# Patient Record
Sex: FEMALE | Race: WHITE | URBAN - METROPOLITAN AREA
[De-identification: names, ages, dates, MRNs, and addresses within clinical notes are randomized per-mention and may not be internally consistent; named-entity substitution may affect disease eponyms.]

---

## 2017-04-08 ENCOUNTER — EMERGENCY (EMERGENCY)
Facility: HOSPITAL | Age: 72
LOS: 0 days | Discharge: HOME | End: 2017-04-08
Admitting: FAMILY MEDICINE
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
## 2017-06-27 DIAGNOSIS — I10 ESSENTIAL (PRIMARY) HYPERTENSION: ICD-10-CM
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
## 2017-06-27 DIAGNOSIS — E11.9 TYPE 2 DIABETES MELLITUS WITHOUT COMPLICATIONS: ICD-10-CM

## 2017-06-27 DIAGNOSIS — H57.8 OTHER SPECIFIED DISORDERS OF EYE AND ADNEXA: ICD-10-CM

## 2017-06-27 DIAGNOSIS — E78.00 PURE HYPERCHOLESTEROLEMIA, UNSPECIFIED: ICD-10-CM

## 2017-06-27 DIAGNOSIS — Z88.0 ALLERGY STATUS TO PENICILLIN: ICD-10-CM

## 2017-06-27 DIAGNOSIS — S05.02XA INJURY OF CONJUNCTIVA AND CORNEAL ABRASION WITHOUT FOREIGN BODY, LEFT EYE, INITIAL ENCOUNTER: ICD-10-CM
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
## 2017-07-17 ENCOUNTER — OUTPATIENT (OUTPATIENT)
Dept: OUTPATIENT SERVICES | Facility: HOSPITAL | Age: 72
LOS: 1 days | Discharge: HOME | End: 2017-07-17

## 2017-07-17 DIAGNOSIS — R42 DIZZINESS AND GIDDINESS: ICD-10-CM

## 2017-10-12 ENCOUNTER — OUTPATIENT (OUTPATIENT)
Dept: OUTPATIENT SERVICES | Facility: HOSPITAL | Age: 72
LOS: 1 days | Discharge: HOME | End: 2017-10-12

## 2017-10-12 DIAGNOSIS — Z12.31 ENCOUNTER FOR SCREENING MAMMOGRAM FOR MALIGNANT NEOPLASM OF BREAST: ICD-10-CM

## 2017-10-17 DIAGNOSIS — N95.9 UNSPECIFIED MENOPAUSAL AND PERIMENOPAUSAL DISORDER: ICD-10-CM

## 2017-10-17 DIAGNOSIS — Z13.820 ENCOUNTER FOR SCREENING FOR OSTEOPOROSIS: ICD-10-CM

## 2018-10-15 ENCOUNTER — OUTPATIENT (OUTPATIENT)
Dept: OUTPATIENT SERVICES | Facility: HOSPITAL | Age: 73
LOS: 1 days | Discharge: HOME | End: 2018-10-15

## 2018-10-15 DIAGNOSIS — Z12.31 ENCOUNTER FOR SCREENING MAMMOGRAM FOR MALIGNANT NEOPLASM OF BREAST: ICD-10-CM

## 2019-06-07 PROBLEM — Z00.00 ENCOUNTER FOR PREVENTIVE HEALTH EXAMINATION: Status: ACTIVE | Noted: 2019-06-07

## 2019-06-24 ENCOUNTER — RECORD ABSTRACTING (OUTPATIENT)
Age: 74
End: 2019-06-24

## 2019-06-24 DIAGNOSIS — Z86.69 PERSONAL HISTORY OF OTHER DISEASES OF THE NERVOUS SYSTEM AND SENSE ORGANS: ICD-10-CM

## 2019-06-24 DIAGNOSIS — Z87.898 PERSONAL HISTORY OF OTHER SPECIFIED CONDITIONS: ICD-10-CM

## 2019-06-24 DIAGNOSIS — Z87.891 PERSONAL HISTORY OF NICOTINE DEPENDENCE: ICD-10-CM

## 2019-06-24 DIAGNOSIS — R10.13 EPIGASTRIC PAIN: ICD-10-CM

## 2019-06-24 RX ORDER — METFORMIN ER 750 MG 750 MG/1
750 TABLET ORAL
Refills: 0 | Status: ACTIVE | COMMUNITY

## 2019-06-24 RX ORDER — LOSARTAN POTASSIUM 25 MG/1
25 TABLET, FILM COATED ORAL
Refills: 0 | Status: ACTIVE | COMMUNITY

## 2019-06-28 ENCOUNTER — RESULT REVIEW (OUTPATIENT)
Age: 74
End: 2019-06-28

## 2019-06-28 ENCOUNTER — OUTPATIENT (OUTPATIENT)
Dept: OUTPATIENT SERVICES | Facility: HOSPITAL | Age: 74
LOS: 1 days | Discharge: HOME | End: 2019-06-28
Payer: MEDICARE

## 2019-06-28 ENCOUNTER — TRANSCRIPTION ENCOUNTER (OUTPATIENT)
Age: 74
End: 2019-06-28

## 2019-06-28 VITALS — HEART RATE: 64 BPM | DIASTOLIC BLOOD PRESSURE: 67 MMHG | SYSTOLIC BLOOD PRESSURE: 112 MMHG | RESPIRATION RATE: 16 BRPM

## 2019-06-28 VITALS — WEIGHT: 164.91 LBS

## 2019-06-28 PROCEDURE — 88312 SPECIAL STAINS GROUP 1: CPT | Mod: 26

## 2019-06-28 PROCEDURE — 43239 EGD BIOPSY SINGLE/MULTIPLE: CPT

## 2019-06-28 PROCEDURE — 88305 TISSUE EXAM BY PATHOLOGIST: CPT | Mod: 26

## 2019-06-28 NOTE — ASU DISCHARGE PLAN (ADULT/PEDIATRIC) - CALL YOUR DOCTOR IF YOU HAVE ANY OF THE FOLLOWING:
Fever greater than (need to indicate Fahrenheit or Celsius)/Pain not relieved by Medications/Inability to tolerate liquids or foods/Nausea and vomiting that does not stop/Bleeding that does not stop

## 2019-06-28 NOTE — H&P PST ADULT - ASSESSMENT
73 yo F with PMH mentioned here for EGD for Dyspepsia   Risks and benefits discussed with the patient.   Will proceed with the scheduled case.

## 2019-07-01 LAB — SURGICAL PATHOLOGY STUDY: SIGNIFICANT CHANGE UP

## 2019-07-04 DIAGNOSIS — K44.9 DIAPHRAGMATIC HERNIA WITHOUT OBSTRUCTION OR GANGRENE: ICD-10-CM

## 2019-07-04 DIAGNOSIS — K31.7 POLYP OF STOMACH AND DUODENUM: ICD-10-CM

## 2019-07-04 DIAGNOSIS — K29.50 UNSPECIFIED CHRONIC GASTRITIS WITHOUT BLEEDING: ICD-10-CM

## 2019-07-04 DIAGNOSIS — R10.13 EPIGASTRIC PAIN: ICD-10-CM

## 2019-07-04 DIAGNOSIS — K20.8 OTHER ESOPHAGITIS: ICD-10-CM

## 2019-07-04 DIAGNOSIS — K59.8 OTHER SPECIFIED FUNCTIONAL INTESTINAL DISORDERS: ICD-10-CM

## 2019-07-17 ENCOUNTER — APPOINTMENT (OUTPATIENT)
Dept: GASTROENTEROLOGY | Facility: CLINIC | Age: 74
End: 2019-07-17
Payer: MEDICARE

## 2019-07-17 VITALS
SYSTOLIC BLOOD PRESSURE: 140 MMHG | BODY MASS INDEX: 32.44 KG/M2 | HEIGHT: 60 IN | WEIGHT: 165.25 LBS | HEART RATE: 81 BPM | DIASTOLIC BLOOD PRESSURE: 80 MMHG

## 2019-07-17 DIAGNOSIS — K31.A0 GASTRIC INTESTINAL METAPLASIA, UNSPECIFIED: ICD-10-CM

## 2019-07-17 DIAGNOSIS — K21.9 GASTRO-ESOPHAGEAL REFLUX DISEASE W/OUT ESOPHAGITIS: ICD-10-CM

## 2019-07-17 DIAGNOSIS — K29.70 GASTRITIS, UNSPECIFIED, W/OUT BLEEDING: ICD-10-CM

## 2019-07-17 DIAGNOSIS — K30 FUNCTIONAL DYSPEPSIA: ICD-10-CM

## 2019-07-17 DIAGNOSIS — K31.7 POLYP OF STOMACH AND DUODENUM: ICD-10-CM

## 2019-07-17 PROBLEM — I10 ESSENTIAL (PRIMARY) HYPERTENSION: Chronic | Status: ACTIVE | Noted: 2019-06-28

## 2019-07-17 PROBLEM — E11.9 TYPE 2 DIABETES MELLITUS WITHOUT COMPLICATIONS: Chronic | Status: ACTIVE | Noted: 2019-06-28

## 2019-07-17 PROCEDURE — 99214 OFFICE O/P EST MOD 30 MIN: CPT

## 2019-07-17 NOTE — HISTORY OF PRESENT ILLNESS
[Heartburn] : stable heartburn [Nausea] : denies nausea [Vomiting] : denies vomiting [Diarrhea] : denies diarrhea [Constipation] : denies constipation [Yellow Skin Or Eyes (Jaundice)] : denies jaundice [Abdominal Pain] : denies abdominal pain [Abdominal Swelling] : denies abdominal swelling [Rectal Pain] : denies rectal pain [de-identified] : Patient is a 72 female with past medical history of hypertension, diabetes, and history of GERD that  presents for followup on of GERD. Patient was prior using a lot of cinamon  which gave her a lot of her dyspepsia. Patient had an EGD done on June 28, 2019 which was notable for esophageal hiatal hernia, esophagitis, erythema in the stomach compatible with a nonerosive gastritis, as well as 3 gastric polyps which were on appearance of a likely inflammatory. Polyps were removed from the stomach noted for intestinal metaplasia and with foveloar hyperplasia without dysplasia. Patient also found at the GE junction to have basal cell hyperplasia. Patient currently feels well and notes dyspepsia is improved.

## 2019-07-17 NOTE — ASSESSMENT
[FreeTextEntry1] : Patient is a 72 female with past medical history of hypertension, diabetes, and history of GERD that  presents for followup on of GERD. Patient was prior using a lot of cinamon  which gave her a lot of her dyspepsia. Patient had an EGD done on June 28, 2019 which was notable for esophageal hiatal hernia, esophagitis, erythema in the stomach compatible with a nonerosive gastritis, as well as 3 gastric polyps which were on appearance of a likely inflammatory. Polyps were removed from the stomach noted for intestinal metaplasia and with foveloar hyperplasia without dysplasia. Patient also found at the GE junction to have basal cell hyperplasia. Patient currently feels well and notes dyspepsia is improved.\par \par GERD/ Foveloar Hyperplasia/ Intestinal Metaplasia - no dysplasia\par - Dietary modification , low caffeine, low acidic foods, avoid cinnamon in excess\par - Avoid Meals 3-4 hours before bed\par - Head of Bed over 30 degrees\par - Pantoprazole 40mg daily\par - Would repeat EGD in one year\par - Follow up in 10 months \par

## 2019-07-17 NOTE — PHYSICAL EXAM
[General Appearance - Alert] : alert [General Appearance - In No Acute Distress] : in no acute distress [Sclera] : the sclera and conjunctiva were normal [PERRL With Normal Accommodation] : pupils were equal in size, round, and reactive to light [Extraocular Movements] : extraocular movements were intact [Outer Ear] : the ears and nose were normal in appearance [Oropharynx] : the oropharynx was normal [Neck Appearance] : the appearance of the neck was normal [Neck Cervical Mass (___cm)] : no neck mass was observed [Jugular Venous Distention Increased] : there was no jugular-venous distention [Thyroid Diffuse Enlargement] : the thyroid was not enlarged [Thyroid Nodule] : there were no palpable thyroid nodules [Auscultation Breath Sounds / Voice Sounds] : lungs were clear to auscultation bilaterally [Heart Rate And Rhythm] : heart rate was normal and rhythm regular [Heart Sounds] : normal S1 and S2 [Heart Sounds Gallop] : no gallops [Murmurs] : no murmurs [Heart Sounds Pericardial Friction Rub] : no pericardial rub [Bowel Sounds] : normal bowel sounds [Abdomen Soft] : soft [Abdomen Tenderness] : non-tender [] : no hepato-splenomegaly [Abdomen Mass (___ Cm)] : no abdominal mass palpated [Abnormal Walk] : normal gait [Skin Color & Pigmentation] : normal skin color and pigmentation [Sensation] : the sensory exam was normal to light touch and pinprick [Oriented To Time, Place, And Person] : oriented to person, place, and time

## 2019-10-07 ENCOUNTER — OUTPATIENT (OUTPATIENT)
Dept: OUTPATIENT SERVICES | Facility: HOSPITAL | Age: 74
LOS: 1 days | Discharge: HOME | End: 2019-10-07
Payer: MEDICARE

## 2019-10-07 DIAGNOSIS — Z12.31 ENCOUNTER FOR SCREENING MAMMOGRAM FOR MALIGNANT NEOPLASM OF BREAST: ICD-10-CM

## 2019-10-07 PROCEDURE — 77067 SCR MAMMO BI INCL CAD: CPT | Mod: 26

## 2019-10-07 PROCEDURE — 77063 BREAST TOMOSYNTHESIS BI: CPT | Mod: 26

## 2019-10-08 DIAGNOSIS — N95.9 UNSPECIFIED MENOPAUSAL AND PERIMENOPAUSAL DISORDER: ICD-10-CM

## 2019-10-08 DIAGNOSIS — Z13.820 ENCOUNTER FOR SCREENING FOR OSTEOPOROSIS: ICD-10-CM

## 2020-01-15 ENCOUNTER — APPOINTMENT (OUTPATIENT)
Dept: GASTROENTEROLOGY | Facility: CLINIC | Age: 75
End: 2020-01-15

## 2020-04-29 ENCOUNTER — OUTPATIENT (OUTPATIENT)
Dept: OUTPATIENT SERVICES | Facility: HOSPITAL | Age: 75
LOS: 1 days | Discharge: HOME | End: 2020-04-29
Payer: MEDICARE

## 2020-04-29 DIAGNOSIS — R22.1 LOCALIZED SWELLING, MASS AND LUMP, NECK: ICD-10-CM

## 2020-04-29 PROCEDURE — 76536 US EXAM OF HEAD AND NECK: CPT | Mod: 26

## 2020-07-12 ENCOUNTER — OUTPATIENT (OUTPATIENT)
Dept: OUTPATIENT SERVICES | Facility: HOSPITAL | Age: 75
LOS: 1 days | Discharge: HOME | End: 2020-07-12
Payer: MEDICARE

## 2020-07-12 DIAGNOSIS — R10.9 UNSPECIFIED ABDOMINAL PAIN: ICD-10-CM

## 2020-07-12 PROCEDURE — 76705 ECHO EXAM OF ABDOMEN: CPT | Mod: 26

## 2020-10-09 ENCOUNTER — OUTPATIENT (OUTPATIENT)
Dept: OUTPATIENT SERVICES | Facility: HOSPITAL | Age: 75
LOS: 1 days | Discharge: HOME | End: 2020-10-09
Payer: MEDICARE

## 2020-10-09 DIAGNOSIS — Z12.31 ENCOUNTER FOR SCREENING MAMMOGRAM FOR MALIGNANT NEOPLASM OF BREAST: ICD-10-CM

## 2020-10-09 PROCEDURE — 77063 BREAST TOMOSYNTHESIS BI: CPT | Mod: 26

## 2020-10-09 PROCEDURE — 77067 SCR MAMMO BI INCL CAD: CPT | Mod: 26

## 2021-08-11 ENCOUNTER — APPOINTMENT (OUTPATIENT)
Dept: ORTHOPEDIC SURGERY | Facility: CLINIC | Age: 76
End: 2021-08-11
Payer: MEDICARE

## 2021-08-11 ENCOUNTER — NON-APPOINTMENT (OUTPATIENT)
Age: 76
End: 2021-08-11

## 2021-08-11 VITALS — TEMPERATURE: 98 F

## 2021-08-11 DIAGNOSIS — M25.562 PAIN IN LEFT KNEE: ICD-10-CM

## 2021-08-11 DIAGNOSIS — E11.9 TYPE 2 DIABETES MELLITUS W/OUT COMPLICATIONS: ICD-10-CM

## 2021-08-11 DIAGNOSIS — K21.9 GASTRO-ESOPHAGEAL REFLUX DISEASE W/OUT ESOPHAGITIS: ICD-10-CM

## 2021-08-11 DIAGNOSIS — M25.561 PAIN IN RIGHT KNEE: ICD-10-CM

## 2021-08-11 DIAGNOSIS — I10 ESSENTIAL (PRIMARY) HYPERTENSION: ICD-10-CM

## 2021-08-11 PROCEDURE — 99204 OFFICE O/P NEW MOD 45 MIN: CPT

## 2021-08-11 PROCEDURE — 73562 X-RAY EXAM OF KNEE 3: CPT | Mod: RT

## 2021-08-13 NOTE — HISTORY OF PRESENT ILLNESS
[de-identified] : 76 year old female presents to the office today complaining of bilateral knee pain, left knee pain worse than right x several years. Patient reports pain that is achy in nature. There is swelling, but patient denies any buckling, locking, or loss of motion. Patient reports only being able to ambulate about ½ block before pain stops her. There is pain and difficulty with negotiating stairs, worse with ascending. Patient is taking Tylenol and Motrin with good relief. Patient is here today to discuss her next options for pain relief.

## 2021-08-13 NOTE — PHYSICAL EXAM
[de-identified] : General appearance: well nourished and hydrated, pleasant, alert and oriented x 3, cooperative.\par Cardiovascular: no apparent abnormalities, no lower leg edema, no varicosities, pedal pulses are palpable.\par Neurologic: sensation is normal, no muscle weakness in upper or lower extremities\par Dermatologic no apparent skin lesions, moist, warm, no rash.\par Gait: nonantalgic.\par \par Left knee\par Inspection: no effusion or erythema.\par Wounds: none.\par Alignment: normal.\par Palpation: none \par ROM: 0-115 degrees, mild PF crepitus \par Ligamentous laxity: all ligaments appear stable\par Muscle Test: good quad strength.\par \par Right knee\par Inspection: no effusion or erythema.\par Wounds: none.\par Alignment: normal.\par Palpation: none\par ROM: 0-115 degrees, mild PF crepitus \par Ligamentous laxity: all ligaments appear stable\par Muscle Test: good quad strength.\par \par Left hip\par Inspection: No swelling or ecchymosis.\par Wounds: none.\par Palpation: non-tender.\par Stability: no instability.\par Strength: 5/5 all motor groups.\par ROM: Flexion to 70, ER 30, ABD 50 \par Leg length: equal.\par \par Right hip\par Inspection: No swelling or ecchymosis.\par Wounds: none.\par Palpation: non-tender.\par Stability: no instability.\par Strength: 5/5 all motor groups.\par ROM: Flexion to 70, ER 30, ABD 50 \par Leg length: equal.\par \par \par \par  [de-identified] : Radiographs done today AP lateral and skyline of both knees shows moderate medial joint space narrowing of both knees with significant join space narrowing of both PF joints on skyline views.

## 2021-08-13 NOTE — ASSESSMENT
[FreeTextEntry1] : 76 year old female presents to the office for bilateral knee pain. She is very active, she does a lot of work in her yard and in the house. Her baseline pain is 1/10 in the right and 4/10 in the left. Although after an active day the pain is worse. Her pain is primarily related to stair climbing. We discussed conservative Tx options. She’s taking OTC meds but is looking for something more. We decided upon gel injections.\par \par \par *ORDER GEL INJECTIONS*\par \par \par

## 2021-09-24 ENCOUNTER — MED ADMIN CHARGE (OUTPATIENT)
Age: 76
End: 2021-09-24

## 2021-09-24 ENCOUNTER — APPOINTMENT (OUTPATIENT)
Dept: ORTHOPEDIC SURGERY | Facility: CLINIC | Age: 76
End: 2021-09-24
Payer: MEDICARE

## 2021-09-24 PROCEDURE — 20610 DRAIN/INJ JOINT/BURSA W/O US: CPT | Mod: LT

## 2021-09-24 RX ORDER — HYALURONATE SODIUM, STABILIZED 88 MG/4 ML
88 SYRINGE (ML) INTRAARTICULAR
Qty: 1 | Refills: 0 | Status: COMPLETED | OUTPATIENT
Start: 2021-09-24

## 2021-09-24 RX ADMIN — Medication 0 MG/4ML: at 00:00

## 2021-10-06 PROBLEM — K31.A0 INTESTINAL METAPLASIA OF GASTRIC MUCOSA: Status: ACTIVE | Noted: 2019-07-17

## 2021-10-21 ENCOUNTER — OUTPATIENT (OUTPATIENT)
Dept: OUTPATIENT SERVICES | Facility: HOSPITAL | Age: 76
LOS: 1 days | Discharge: HOME | End: 2021-10-21
Payer: MEDICARE

## 2021-10-21 DIAGNOSIS — Z12.31 ENCOUNTER FOR SCREENING MAMMOGRAM FOR MALIGNANT NEOPLASM OF BREAST: ICD-10-CM

## 2021-10-21 PROCEDURE — 77067 SCR MAMMO BI INCL CAD: CPT | Mod: 26

## 2021-10-21 PROCEDURE — 77063 BREAST TOMOSYNTHESIS BI: CPT | Mod: 26

## 2021-11-29 ENCOUNTER — OUTPATIENT (OUTPATIENT)
Dept: OUTPATIENT SERVICES | Facility: HOSPITAL | Age: 76
LOS: 1 days | Discharge: HOME | End: 2021-11-29
Payer: MEDICARE

## 2021-11-29 DIAGNOSIS — M25.519 PAIN IN UNSPECIFIED SHOULDER: ICD-10-CM

## 2021-11-29 PROCEDURE — 73030 X-RAY EXAM OF SHOULDER: CPT | Mod: 26,RT

## 2022-03-18 ENCOUNTER — APPOINTMENT (OUTPATIENT)
Dept: ORTHOPEDIC SURGERY | Facility: CLINIC | Age: 77
End: 2022-03-18
Payer: MEDICARE

## 2022-03-18 PROCEDURE — 99214 OFFICE O/P EST MOD 30 MIN: CPT

## 2022-03-18 RX ORDER — MELOXICAM 15 MG/1
15 TABLET ORAL
Qty: 30 | Refills: 0 | Status: ACTIVE | COMMUNITY
Start: 2022-03-18 | End: 1900-01-01

## 2022-03-18 RX ORDER — PANTOPRAZOLE 40 MG/1
40 TABLET, DELAYED RELEASE ORAL DAILY
Qty: 30 | Refills: 0 | Status: ACTIVE | COMMUNITY
Start: 2019-07-17 | End: 1900-01-01

## 2022-03-18 RX ORDER — MELOXICAM 15 MG/1
15 TABLET ORAL DAILY
Qty: 30 | Refills: 0 | Status: DISCONTINUED | COMMUNITY
Start: 2022-03-18 | End: 2022-03-18

## 2022-03-18 NOTE — HISTORY OF PRESENT ILLNESS
[de-identified] : 76 year old female presents to the office today for Monovisc injections into her left arthritic knee.

## 2022-03-21 NOTE — HISTORY OF PRESENT ILLNESS
[de-identified] : 76 year old female presents to the office today for a follow up after having received a Euflexxa injection into her arthritic left knee. Patient denies any pain relief from the injection. She states there was bruising and erythema after but then it improved. Patient is complaining of pinching pain that is sharp in nature, mostly medial. this pain was here prior to her Monovisc injection and has not worsened or improved. She reports times of increased swelling in the left knee. She was seen at another orthopedists office who ordered an MRI, but she denies ever getting a report or results from this. Patient is here today to discuss her next options for pain relief.

## 2022-03-21 NOTE — ASSESSMENT
[FreeTextEntry1] : Pt presents for a follow up of her left arthritic knee. She had gel injections in the past which she claims made her feel worse. She apparently had a MRI at [facility] in 2017. Pt does not know the results. The daughter zina try to obtain it for our review. In the meantime we will start her on Meloxicam with Pantoprazole. The pt and family requested an MRI to look for a meniscal tear.

## 2022-03-21 NOTE — PHYSICAL EXAM
[de-identified] : Left Knee\par Inspection: no effusion or erythema.\par Wounds: none.\par Alignment: normal.\par Palpation: medial joint line tenderness \par ROM: PF crepitus , 0-120\par Ligamentous laxity: all ligaments appear stable\par Meniscal Test: negative McMurrays.\par Muscle Test: good quad strength.\par Leg examination: calf is soft and non-tender.\par \par Right knee\par Inspection: no effusion or erythema.\par Wounds: none.\par Alignment: normal.\par Palpation: no specific tenderness on palpation.\par ROM: 0-120 degrees \par Ligamentous laxity: all ligaments appear \par Meniscal Test: negative McMurrays\par Muscle Test: good quad strength.\par Leg examination: calf is soft and non-tender.\par   [de-identified] : Radiographs done today AP lateral and skyline of both knees shows significant narrowing of medial joint space with almost complete loss of PF joint space on the left. The right knee shows minimal medial joint space narrowing.

## 2022-04-11 ENCOUNTER — RESULT REVIEW (OUTPATIENT)
Age: 77
End: 2022-04-11

## 2022-04-11 ENCOUNTER — OUTPATIENT (OUTPATIENT)
Dept: OUTPATIENT SERVICES | Facility: HOSPITAL | Age: 77
LOS: 1 days | Discharge: HOME | End: 2022-04-11
Payer: MEDICARE

## 2022-04-11 DIAGNOSIS — M25.569 PAIN IN UNSPECIFIED KNEE: ICD-10-CM

## 2022-04-11 PROCEDURE — 73721 MRI JNT OF LWR EXTRE W/O DYE: CPT | Mod: 26,LT

## 2022-06-07 PROBLEM — Z78.9 NO PERTINENT FAMILY HISTORY: Status: ACTIVE | Noted: 2022-06-07

## 2022-06-08 ENCOUNTER — APPOINTMENT (OUTPATIENT)
Dept: ORTHOPEDIC SURGERY | Facility: CLINIC | Age: 77
End: 2022-06-08
Payer: MEDICARE

## 2022-06-08 DIAGNOSIS — Z78.9 OTHER SPECIFIED HEALTH STATUS: ICD-10-CM

## 2022-06-08 DIAGNOSIS — M17.12 UNILATERAL PRIMARY OSTEOARTHRITIS, LEFT KNEE: ICD-10-CM

## 2022-06-08 DIAGNOSIS — M17.11 UNILATERAL PRIMARY OSTEOARTHRITIS, RIGHT KNEE: ICD-10-CM

## 2022-06-08 PROCEDURE — 99214 OFFICE O/P EST MOD 30 MIN: CPT

## 2022-06-08 RX ORDER — MELOXICAM 15 MG/1
15 TABLET ORAL
Qty: 30 | Refills: 0 | Status: ACTIVE | COMMUNITY
Start: 2022-06-08 | End: 1900-01-01

## 2022-06-08 NOTE — HISTORY OF PRESENT ILLNESS
[de-identified] : 3/18/2022- 76 year old female presents to the office today for a follow up after having received a Euflexxa injection into her arthritic left knee. Patient denies any pain relief from the injection. She states there was bruising and erythema after but then it improved. Patient is complaining of pinching pain that is sharp in nature, mostly medial. this pain was here prior to her Monovisc injection and has not worsened or improved. She reports times of increased swelling in the left knee. She was seen at another orthopedists office who ordered an MRI, but she denies ever getting a report or results from this. Patient is here today to discuss her next options for pain relief.\par \par 6/8/2022 - 77 year old female presents to the office today for a pre op discussion. After last seeing us, an MRI was obtained which showed advanced osteoarthritis of her left knee. Patient decided to move forward with a left total knee replacement and is scheduled for October 2022. Patient denies any change in her medical history. She is taking Motrin for pain daily with only minimal relief. Patient states she was taking Meloxicam in the past with some relief, but has run out of this and would like a refill to help control her pain while she waits for her surgical date.

## 2022-06-08 NOTE — ASSESSMENT
[FreeTextEntry1] : 3/18/2022- Pt presents for a follow up of her left arthritic knee. She had gel injections in the past which she claims made her feel worse. She apparently had a MRI at [facility] in 2017. Pt does not know the results. The daughter will try to obtain it for our review. In the meantime we will start her on Meloxicam with Pantoprazole. The pt and family requested an MRI to look for a meniscal tear. \par \par 6/8/2022- Pt presents for a pre op discussion. She is scheduled for a LTK in October. She has had no interval change in her medical status or medications. We discussed all the risks and benefits of Sx. I answered all her questions. We will proceed as as planned. \par \par The risks, benefits, alternatives of treatment, and aftercare precautions following joint replacement surgery were reviewed with the patient and all questions were answered. Models were used, radiographs reviewed and a brochure was given to the patient. The implant type utilized, including bearing surfaces fixation techniques were reviewed in detail, and the patient chose to proceed with elective joint replacement surgery. The patient's body mass index was recorded in my office notes, and for those patients whose  body mass index of greater than 30, I recommended that they review a weight reduction program with their internist. The importance of smoking cessation was reviewed with all patient's, and for those patients who still smoke, I recommended that they review a smoking cessation program with their internist.

## 2022-06-08 NOTE — PHYSICAL EXAM
[de-identified] : Left Knee\par Inspection: no effusion or erythema.\par Wounds: none.\par Alignment: normal.\par Palpation: medial joint line tenderness \par ROM: PF crepitus , 0-120\par Ligamentous laxity: all ligaments appear stable\par Meniscal Test: negative McMurrays.\par Muscle Test: good quad strength.\par Leg examination: calf is soft and non-tender.\par \par Right knee\par Inspection: no effusion or erythema.\par Wounds: none.\par Alignment: normal.\par Palpation: no specific tenderness on palpation.\par ROM: 0-120 degrees \par Ligamentous laxity: all ligaments appear \par Meniscal Test: negative McMurrays\par Muscle Test: good quad strength.\par Leg examination: calf is soft and non-tender.\par   [de-identified] : 3/18/2022- Radiographs done today AP lateral and skyline of both knees shows significant narrowing of medial joint space with almost complete loss of PF joint space on the left. The right knee shows minimal medial joint space narrowing.

## 2022-07-05 ENCOUNTER — RX RENEWAL (OUTPATIENT)
Age: 77
End: 2022-07-05

## 2022-07-19 ENCOUNTER — RESULT REVIEW (OUTPATIENT)
Age: 77
End: 2022-07-19

## 2022-07-19 ENCOUNTER — OUTPATIENT (OUTPATIENT)
Dept: OUTPATIENT SERVICES | Facility: HOSPITAL | Age: 77
LOS: 1 days | Discharge: HOME | End: 2022-07-19

## 2022-07-19 VITALS
HEIGHT: 60 IN | HEART RATE: 68 BPM | TEMPERATURE: 96 F | RESPIRATION RATE: 18 BRPM | WEIGHT: 154.1 LBS | OXYGEN SATURATION: 98 % | SYSTOLIC BLOOD PRESSURE: 157 MMHG | DIASTOLIC BLOOD PRESSURE: 71 MMHG

## 2022-07-19 DIAGNOSIS — Z01.818 ENCOUNTER FOR OTHER PREPROCEDURAL EXAMINATION: ICD-10-CM

## 2022-07-19 DIAGNOSIS — M17.12 UNILATERAL PRIMARY OSTEOARTHRITIS, LEFT KNEE: ICD-10-CM

## 2022-07-19 DIAGNOSIS — Z98.890 OTHER SPECIFIED POSTPROCEDURAL STATES: Chronic | ICD-10-CM

## 2022-07-19 LAB
A1C WITH ESTIMATED AVERAGE GLUCOSE RESULT: 7 % — HIGH (ref 4–5.6)
ALBUMIN SERPL ELPH-MCNC: 4.1 G/DL — SIGNIFICANT CHANGE UP (ref 3.5–5.2)
ALP SERPL-CCNC: 108 U/L — SIGNIFICANT CHANGE UP (ref 30–115)
ALT FLD-CCNC: 11 U/L — SIGNIFICANT CHANGE UP (ref 0–41)
ANION GAP SERPL CALC-SCNC: 11 MMOL/L — SIGNIFICANT CHANGE UP (ref 7–14)
APTT BLD: 28.1 SEC — SIGNIFICANT CHANGE UP (ref 27–39.2)
AST SERPL-CCNC: 12 U/L — SIGNIFICANT CHANGE UP (ref 0–41)
BASOPHILS # BLD AUTO: 0.02 K/UL — SIGNIFICANT CHANGE UP (ref 0–0.2)
BASOPHILS NFR BLD AUTO: 0.3 % — SIGNIFICANT CHANGE UP (ref 0–1)
BILIRUB SERPL-MCNC: 0.5 MG/DL — SIGNIFICANT CHANGE UP (ref 0.2–1.2)
BLD GP AB SCN SERPL QL: SIGNIFICANT CHANGE UP
BUN SERPL-MCNC: 18 MG/DL — SIGNIFICANT CHANGE UP (ref 10–20)
CALCIUM SERPL-MCNC: 9.4 MG/DL — SIGNIFICANT CHANGE UP (ref 8.5–10.1)
CHLORIDE SERPL-SCNC: 103 MMOL/L — SIGNIFICANT CHANGE UP (ref 98–110)
CO2 SERPL-SCNC: 27 MMOL/L — SIGNIFICANT CHANGE UP (ref 17–32)
CREAT SERPL-MCNC: 0.7 MG/DL — SIGNIFICANT CHANGE UP (ref 0.7–1.5)
EGFR: 90 ML/MIN/1.73M2 — SIGNIFICANT CHANGE UP
EOSINOPHIL # BLD AUTO: 0.14 K/UL — SIGNIFICANT CHANGE UP (ref 0–0.7)
EOSINOPHIL NFR BLD AUTO: 2.3 % — SIGNIFICANT CHANGE UP (ref 0–8)
ESTIMATED AVERAGE GLUCOSE: 154 MG/DL — HIGH (ref 68–114)
GLUCOSE SERPL-MCNC: 177 MG/DL — HIGH (ref 70–99)
HCT VFR BLD CALC: 40.7 % — SIGNIFICANT CHANGE UP (ref 37–47)
HGB BLD-MCNC: 14 G/DL — SIGNIFICANT CHANGE UP (ref 12–16)
IMM GRANULOCYTES NFR BLD AUTO: 0.5 % — HIGH (ref 0.1–0.3)
INR BLD: 0.98 RATIO — SIGNIFICANT CHANGE UP (ref 0.65–1.3)
LYMPHOCYTES # BLD AUTO: 2.43 K/UL — SIGNIFICANT CHANGE UP (ref 1.2–3.4)
LYMPHOCYTES # BLD AUTO: 39.3 % — SIGNIFICANT CHANGE UP (ref 20.5–51.1)
MCHC RBC-ENTMCNC: 30.1 PG — SIGNIFICANT CHANGE UP (ref 27–31)
MCHC RBC-ENTMCNC: 34.4 G/DL — SIGNIFICANT CHANGE UP (ref 32–37)
MCV RBC AUTO: 87.5 FL — SIGNIFICANT CHANGE UP (ref 81–99)
MONOCYTES # BLD AUTO: 0.55 K/UL — SIGNIFICANT CHANGE UP (ref 0.1–0.6)
MONOCYTES NFR BLD AUTO: 8.9 % — SIGNIFICANT CHANGE UP (ref 1.7–9.3)
MRSA PCR RESULT.: NEGATIVE — SIGNIFICANT CHANGE UP
NEUTROPHILS # BLD AUTO: 3.02 K/UL — SIGNIFICANT CHANGE UP (ref 1.4–6.5)
NEUTROPHILS NFR BLD AUTO: 48.7 % — SIGNIFICANT CHANGE UP (ref 42.2–75.2)
NRBC # BLD: 0 /100 WBCS — SIGNIFICANT CHANGE UP (ref 0–0)
PLATELET # BLD AUTO: 188 K/UL — SIGNIFICANT CHANGE UP (ref 130–400)
POTASSIUM SERPL-MCNC: 3.9 MMOL/L — SIGNIFICANT CHANGE UP (ref 3.5–5)
POTASSIUM SERPL-SCNC: 3.9 MMOL/L — SIGNIFICANT CHANGE UP (ref 3.5–5)
PROT SERPL-MCNC: 6.6 G/DL — SIGNIFICANT CHANGE UP (ref 6–8)
PROTHROM AB SERPL-ACNC: 11.3 SEC — SIGNIFICANT CHANGE UP (ref 9.95–12.87)
RBC # BLD: 4.65 M/UL — SIGNIFICANT CHANGE UP (ref 4.2–5.4)
RBC # FLD: 12.7 % — SIGNIFICANT CHANGE UP (ref 11.5–14.5)
SODIUM SERPL-SCNC: 141 MMOL/L — SIGNIFICANT CHANGE UP (ref 135–146)
WBC # BLD: 6.19 K/UL — SIGNIFICANT CHANGE UP (ref 4.8–10.8)
WBC # FLD AUTO: 6.19 K/UL — SIGNIFICANT CHANGE UP (ref 4.8–10.8)

## 2022-07-19 PROCEDURE — 93010 ELECTROCARDIOGRAM REPORT: CPT

## 2022-07-19 PROCEDURE — 72170 X-RAY EXAM OF PELVIS: CPT | Mod: 26

## 2022-07-19 PROCEDURE — 71046 X-RAY EXAM CHEST 2 VIEWS: CPT | Mod: 26

## 2022-07-19 PROCEDURE — 73562 X-RAY EXAM OF KNEE 3: CPT | Mod: 26,LT

## 2022-07-19 RX ORDER — LOSARTAN POTASSIUM 100 MG/1
1 TABLET, FILM COATED ORAL
Qty: 0 | Refills: 0 | DISCHARGE

## 2022-07-19 NOTE — H&P PST ADULT - NSICDXPASTSURGICALHX_GEN_ALL_CORE_FT
PAST SURGICAL HISTORY:  H/O shoulder surgery right    Previous back surgery     S/P surgical removal of pilonidal cyst

## 2022-07-19 NOTE — H&P PST ADULT - NSICDXPASTMEDICALHX_GEN_ALL_CORE_FT
PAST MEDICAL HISTORY:  Diabetes niddm    HTN (hypertension)     OA (osteoarthritis)      PAST MEDICAL HISTORY:  Diabetes niddm    HTN (hypertension)     OA (osteoarthritis)     Obesity bmi 30.1

## 2022-07-19 NOTE — H&P PST ADULT - SOURCE OF INFORMATION, PROFILE
Notification of Discharge   This is a Notification of Discharge from our facility [address]  Please be advised that this patient has been discharge from our facility  Below you will find the admission and discharge date and time including the patients disposition  UTILIZATION REVIEW CONTACT:  Ethel Baker  Utilization   Network Utilization Review Department  Phone: [phone] x carefully listen to the prompts  All voicemails are confidential   Email: [email or web address]     PHYSICIAN ADVISORY SERVICES:  FOR [id number] REVIEW - MEDICAL NECESSITY DENIAL  Phone: [phone]  Fax: [phone]  Email: [email or web address]     PRESENTATION DATE: 5/3/2021  6:51 PM  OBERVATION ADMISSION DATE:  INPATIENT ADMISSION DATE: 5/3/21 2128   DISCHARGE DATE: 5/7/2021  5:21 PM  DISPOSITION: Home with [place] with [address] INFORMATION:  Send all requests for admission clinical reviews, approved or denied determinations and any other requests to dedicated fax number below belonging to the campus where the patient is receiving treatment   List of dedicated fax numbers:  [address] DENIALS (Administrative/Medical Necessity) [phone]   [address] (Maternity/NICU/Pediatrics) [phone]   Sharifa Fines [phone]   [facility] [phone]   [facility] [address] [facility] [phone]   Senait Estrada [address] [facility] [phone]   [facility]  [phone]   [address] [facility]  [address] [facility] [address] [facility] [phone] patient

## 2022-07-19 NOTE — H&P PST ADULT - HISTORY OF PRESENT ILLNESS
fell 2017 and since then left knee an issue  worsen over the past 2 yrs  therapies not working   now for planed procedure       PATIENT CURRENTLY DENIES CHEST PAIN  SHORTNESS OF BREATH  PALPITATIONS,  DYSURIA, OR UPPER RESPIRATORY INFECTION IN PAST 2 WEEKS  EXERCISE  TOLERANCE  1-2 FLIGHT OF STAIRS  WITHOUT SHORTNESS OF BREATH  denies travel outside the [place] in the past 30 days  Patient denies any signs or symptoms of COVID 19 and denies contact with known positive individuals.  They have an appointment for repeat COVID testing pre-procedure and acknowledge its time and place.  They were instructed to quarantine pre-procedure, practice exposure control measures, continue to self-monitor and report any concerns to their proceduralist.  pt advised self quarantine till day of procedure  Anesthesia Alert  NO--Difficult Airway  NO--History of neck surgery or radiation  NO--Limited ROM of neck  NO--History of Malignant hyperthermia  NO--No personal or family history of Pseudocholinesterase deficiency.  NO--Prior Anesthesia Complication  NO--Latex Allergy  NO--Loose teeth  NO--History of Rheumatoid Arthritis  NO--Bleeding risk  NO--TRU  NO--Other_____    PT DENIES ANY RASHES, ABRASION, OR OPEN WOUNDS OR CUTS    AS PER THE PT, THIS IS HIS/HER COMPLETE MEDICAL AND SURGICAL HX, INCLUDING MEDICATIONS PRESCRIBED AND OVER THE COUNTER    Patient verbalized understanding of instructions and was given the opportunity to ask questions and have them answered.    pt denies any suicidal ideation or thoughts, pt states not a threat to self or others    M17.12/M17.12    Primary osteoarthritis of left knee    Encounter for other preprocedural examination    ^M17.12/M17.12    Primary osteoarthritis of left knee    Encounter for other preprocedural examination    HTN (hypertension)    Diabetes

## 2022-07-30 ENCOUNTER — LABORATORY RESULT (OUTPATIENT)
Age: 77
End: 2022-07-30

## 2022-08-02 ENCOUNTER — APPOINTMENT (OUTPATIENT)
Dept: ORTHOPEDIC SURGERY | Facility: HOSPITAL | Age: 77
End: 2022-08-02

## 2022-08-02 ENCOUNTER — RESULT REVIEW (OUTPATIENT)
Age: 77
End: 2022-08-02

## 2022-08-02 ENCOUNTER — TRANSCRIPTION ENCOUNTER (OUTPATIENT)
Age: 77
End: 2022-08-02

## 2022-08-02 ENCOUNTER — INPATIENT (INPATIENT)
Facility: HOSPITAL | Age: 77
LOS: 0 days | Discharge: HOME | End: 2022-08-03
Attending: ORTHOPAEDIC SURGERY | Admitting: ORTHOPAEDIC SURGERY

## 2022-08-02 VITALS
DIASTOLIC BLOOD PRESSURE: 65 MMHG | SYSTOLIC BLOOD PRESSURE: 144 MMHG | HEART RATE: 65 BPM | RESPIRATION RATE: 17 BRPM | TEMPERATURE: 97 F | WEIGHT: 154.1 LBS | HEIGHT: 59 IN | OXYGEN SATURATION: 100 %

## 2022-08-02 DIAGNOSIS — Z98.890 OTHER SPECIFIED POSTPROCEDURAL STATES: Chronic | ICD-10-CM

## 2022-08-02 LAB
GLUCOSE BLDC GLUCOMTR-MCNC: 151 MG/DL — HIGH (ref 70–99)
GLUCOSE BLDC GLUCOMTR-MCNC: 188 MG/DL — HIGH (ref 70–99)
GLUCOSE BLDC GLUCOMTR-MCNC: 191 MG/DL — HIGH (ref 70–99)
GLUCOSE BLDC GLUCOMTR-MCNC: 201 MG/DL — HIGH (ref 70–99)

## 2022-08-02 PROCEDURE — 88305 TISSUE EXAM BY PATHOLOGIST: CPT | Mod: 26

## 2022-08-02 PROCEDURE — 27447 TOTAL KNEE ARTHROPLASTY: CPT | Mod: LT

## 2022-08-02 PROCEDURE — 88311 DECALCIFY TISSUE: CPT | Mod: 26

## 2022-08-02 PROCEDURE — 73560 X-RAY EXAM OF KNEE 1 OR 2: CPT | Mod: 26,LT

## 2022-08-02 RX ORDER — DEXTROSE 50 % IN WATER 50 %
25 SYRINGE (ML) INTRAVENOUS ONCE
Refills: 0 | Status: DISCONTINUED | OUTPATIENT
Start: 2022-08-02 | End: 2022-08-03

## 2022-08-02 RX ORDER — CELECOXIB 200 MG/1
200 CAPSULE ORAL EVERY 12 HOURS
Refills: 0 | Status: DISCONTINUED | OUTPATIENT
Start: 2022-08-03 | End: 2022-08-03

## 2022-08-02 RX ORDER — ASPIRIN/CALCIUM CARB/MAGNESIUM 324 MG
81 TABLET ORAL EVERY 12 HOURS
Refills: 0 | Status: DISCONTINUED | OUTPATIENT
Start: 2022-08-02 | End: 2022-08-03

## 2022-08-02 RX ORDER — METFORMIN HYDROCHLORIDE 850 MG/1
1 TABLET ORAL
Qty: 0 | Refills: 0 | DISCHARGE

## 2022-08-02 RX ORDER — INSULIN LISPRO 100/ML
VIAL (ML) SUBCUTANEOUS
Refills: 0 | Status: DISCONTINUED | OUTPATIENT
Start: 2022-08-02 | End: 2022-08-03

## 2022-08-02 RX ORDER — ONDANSETRON 8 MG/1
4 TABLET, FILM COATED ORAL EVERY 6 HOURS
Refills: 0 | Status: DISCONTINUED | OUTPATIENT
Start: 2022-08-02 | End: 2022-08-03

## 2022-08-02 RX ORDER — SODIUM CHLORIDE 9 MG/ML
1000 INJECTION, SOLUTION INTRAVENOUS
Refills: 0 | Status: DISCONTINUED | OUTPATIENT
Start: 2022-08-02 | End: 2022-08-03

## 2022-08-02 RX ORDER — CEFAZOLIN SODIUM 1 G
2000 VIAL (EA) INJECTION EVERY 8 HOURS
Refills: 0 | Status: COMPLETED | OUTPATIENT
Start: 2022-08-02 | End: 2022-08-02

## 2022-08-02 RX ORDER — POLYETHYLENE GLYCOL 3350 17 G/17G
17 POWDER, FOR SOLUTION ORAL AT BEDTIME
Refills: 0 | Status: DISCONTINUED | OUTPATIENT
Start: 2022-08-02 | End: 2022-08-03

## 2022-08-02 RX ORDER — GLUCAGON INJECTION, SOLUTION 0.5 MG/.1ML
1 INJECTION, SOLUTION SUBCUTANEOUS ONCE
Refills: 0 | Status: DISCONTINUED | OUTPATIENT
Start: 2022-08-02 | End: 2022-08-03

## 2022-08-02 RX ORDER — SODIUM CHLORIDE 9 MG/ML
1000 INJECTION, SOLUTION INTRAVENOUS
Refills: 0 | Status: DISCONTINUED | OUTPATIENT
Start: 2022-08-02 | End: 2022-08-02

## 2022-08-02 RX ORDER — MAGNESIUM HYDROXIDE 400 MG/1
30 TABLET, CHEWABLE ORAL DAILY
Refills: 0 | Status: DISCONTINUED | OUTPATIENT
Start: 2022-08-02 | End: 2022-08-03

## 2022-08-02 RX ORDER — ACETAMINOPHEN 500 MG
650 TABLET ORAL EVERY 6 HOURS
Refills: 0 | Status: DISCONTINUED | OUTPATIENT
Start: 2022-08-02 | End: 2022-08-03

## 2022-08-02 RX ORDER — HYDROMORPHONE HYDROCHLORIDE 2 MG/ML
0.5 INJECTION INTRAMUSCULAR; INTRAVENOUS; SUBCUTANEOUS
Refills: 0 | Status: DISCONTINUED | OUTPATIENT
Start: 2022-08-02 | End: 2022-08-02

## 2022-08-02 RX ORDER — OXYCODONE HYDROCHLORIDE 5 MG/1
5 TABLET ORAL EVERY 4 HOURS
Refills: 0 | Status: DISCONTINUED | OUTPATIENT
Start: 2022-08-02 | End: 2022-08-03

## 2022-08-02 RX ORDER — DEXTROSE 50 % IN WATER 50 %
15 SYRINGE (ML) INTRAVENOUS ONCE
Refills: 0 | Status: DISCONTINUED | OUTPATIENT
Start: 2022-08-02 | End: 2022-08-03

## 2022-08-02 RX ORDER — SENNA PLUS 8.6 MG/1
2 TABLET ORAL AT BEDTIME
Refills: 0 | Status: DISCONTINUED | OUTPATIENT
Start: 2022-08-02 | End: 2022-08-03

## 2022-08-02 RX ORDER — ONDANSETRON 8 MG/1
4 TABLET, FILM COATED ORAL ONCE
Refills: 0 | Status: DISCONTINUED | OUTPATIENT
Start: 2022-08-02 | End: 2022-08-02

## 2022-08-02 RX ORDER — LEVOTHYROXINE SODIUM 125 MCG
25 TABLET ORAL DAILY
Refills: 0 | Status: DISCONTINUED | OUTPATIENT
Start: 2022-08-02 | End: 2022-08-03

## 2022-08-02 RX ORDER — ACETAMINOPHEN 500 MG
1000 TABLET ORAL ONCE
Refills: 0 | Status: DISCONTINUED | OUTPATIENT
Start: 2022-08-02 | End: 2022-08-02

## 2022-08-02 RX ORDER — DEXTROSE 50 % IN WATER 50 %
12.5 SYRINGE (ML) INTRAVENOUS ONCE
Refills: 0 | Status: DISCONTINUED | OUTPATIENT
Start: 2022-08-02 | End: 2022-08-03

## 2022-08-02 RX ORDER — TRAMADOL HYDROCHLORIDE 50 MG/1
50 TABLET ORAL EVERY 4 HOURS
Refills: 0 | Status: DISCONTINUED | OUTPATIENT
Start: 2022-08-02 | End: 2022-08-03

## 2022-08-02 RX ORDER — MELOXICAM 15 MG/1
1 TABLET ORAL
Qty: 0 | Refills: 0 | DISCHARGE

## 2022-08-02 RX ORDER — PANTOPRAZOLE SODIUM 20 MG/1
40 TABLET, DELAYED RELEASE ORAL
Refills: 0 | Status: DISCONTINUED | OUTPATIENT
Start: 2022-08-02 | End: 2022-08-03

## 2022-08-02 RX ORDER — CHLORHEXIDINE GLUCONATE 213 G/1000ML
1 SOLUTION TOPICAL
Refills: 0 | Status: DISCONTINUED | OUTPATIENT
Start: 2022-08-02 | End: 2022-08-03

## 2022-08-02 RX ORDER — LEVOTHYROXINE SODIUM 125 MCG
1 TABLET ORAL
Qty: 0 | Refills: 0 | DISCHARGE

## 2022-08-02 RX ORDER — KETOROLAC TROMETHAMINE 30 MG/ML
15 SYRINGE (ML) INJECTION EVERY 6 HOURS
Refills: 0 | Status: DISCONTINUED | OUTPATIENT
Start: 2022-08-02 | End: 2022-08-03

## 2022-08-02 RX ORDER — CELECOXIB 200 MG/1
200 CAPSULE ORAL ONCE
Refills: 0 | Status: DISCONTINUED | OUTPATIENT
Start: 2022-08-02 | End: 2022-08-02

## 2022-08-02 RX ORDER — SODIUM CHLORIDE 9 MG/ML
1000 INJECTION INTRAMUSCULAR; INTRAVENOUS; SUBCUTANEOUS
Refills: 0 | Status: DISCONTINUED | OUTPATIENT
Start: 2022-08-02 | End: 2022-08-03

## 2022-08-02 RX ADMIN — Medication 15 MILLIGRAM(S): at 23:42

## 2022-08-02 RX ADMIN — Medication 650 MILLIGRAM(S): at 12:36

## 2022-08-02 RX ADMIN — Medication 650 MILLIGRAM(S): at 17:31

## 2022-08-02 RX ADMIN — Medication 15 MILLIGRAM(S): at 17:09

## 2022-08-02 RX ADMIN — Medication 1: at 16:46

## 2022-08-02 RX ADMIN — Medication 100 MILLIGRAM(S): at 23:58

## 2022-08-02 RX ADMIN — Medication 650 MILLIGRAM(S): at 17:08

## 2022-08-02 RX ADMIN — Medication 15 MILLIGRAM(S): at 17:32

## 2022-08-02 RX ADMIN — Medication 100 MILLIGRAM(S): at 16:17

## 2022-08-02 RX ADMIN — Medication 650 MILLIGRAM(S): at 23:41

## 2022-08-02 RX ADMIN — Medication 1 TABLET(S): at 12:35

## 2022-08-02 RX ADMIN — SODIUM CHLORIDE 75 MILLILITER(S): 9 INJECTION INTRAMUSCULAR; INTRAVENOUS; SUBCUTANEOUS at 14:20

## 2022-08-02 RX ADMIN — Medication 81 MILLIGRAM(S): at 17:08

## 2022-08-02 RX ADMIN — SODIUM CHLORIDE 75 MILLILITER(S): 9 INJECTION, SOLUTION INTRAVENOUS at 12:35

## 2022-08-02 NOTE — PHYSICAL THERAPY INITIAL EVALUATION ADULT - ADDITIONAL COMMENTS
pt lives with her  in a private house with 3 steps at the side door with 1 rail on the L, 3 steps to bedroom and bathroom area but pt is going to stay on the main floor for the beginning. pt was independent community ambulator prior to admission with occasional use of SC for outside amb

## 2022-08-02 NOTE — ASU PATIENT PROFILE, ADULT - FALL HARM RISK - HARM RISK INTERVENTIONS

## 2022-08-02 NOTE — PATIENT PROFILE ADULT - FALL HARM RISK - HARM RISK INTERVENTIONS
Assistance with ambulation/Assistance OOB with selected safe patient handling equipment/Communicate Risk of Fall with Harm to all staff/Discuss with provider need for PT consult/Monitor gait and stability/Provide patient with walking aids - walker, cane, crutches/Reinforce activity limits and safety measures with patient and family/Sit up slowly, dangle for a short time, stand at bedside before walking/Tailored Fall Risk Interventions/Use of alarms - bed, chair and/or voice tab/Visual Cue: Yellow wristband and red socks/Bed in lowest position, wheels locked, appropriate side rails in place/Call bell, personal items and telephone in reach/Instruct patient to call for assistance before getting out of bed or chair/Non-slip footwear when patient is out of bed/[place] to call system/Physically safe environment - no spills, clutter or unnecessary equipment/Purposeful Proactive Rounding/Room/bathroom lighting operational, light cord in reach

## 2022-08-02 NOTE — PROGRESS NOTE ADULT - ASSESSMENT
s/p left tkr pod 0     pain control '  dvt proph   am labs   pt ot   abx 24 hours   incentive   med cs   dispo planning

## 2022-08-02 NOTE — PHYSICAL THERAPY INITIAL EVALUATION ADULT - GENERAL OBSERVATIONS, REHAB EVAL
PT 14;45-15;15 pt was seen for PT IE at bed side, pt is agreeable, chart thoroughly reviewed, RN Arsenio is aware. pt was received semi blue in bed, in no apparent distress, +IV, +aquacell dressing in dry and intact, + ace wrap, +sequentials B LE, +call bell within reach, bed side table at reach, daughter is at bed side

## 2022-08-02 NOTE — PHYSICAL THERAPY INITIAL EVALUATION ADULT - RANGE OF MOTION EXAMINATION, REHAB EVAL
L knee flex - 70 based on functional performance/bilateral lower extremity ROM was WFL (within functional limits)/deficits as listed below

## 2022-08-02 NOTE — ASU PATIENT PROFILE, ADULT - NSICDXPASTMEDICALHX_GEN_ALL_CORE_FT
PAST MEDICAL HISTORY:  Diabetes niddm    H/O colonoscopy 2022    HTN (hypertension)     Hypothyroid     OA (osteoarthritis)     Obesity bmi 30.1

## 2022-08-03 ENCOUNTER — TRANSCRIPTION ENCOUNTER (OUTPATIENT)
Age: 77
End: 2022-08-03

## 2022-08-03 VITALS
DIASTOLIC BLOOD PRESSURE: 88 MMHG | SYSTOLIC BLOOD PRESSURE: 132 MMHG | HEART RATE: 86 BPM | TEMPERATURE: 98 F | RESPIRATION RATE: 16 BRPM

## 2022-08-03 LAB
ANION GAP SERPL CALC-SCNC: 10 MMOL/L — SIGNIFICANT CHANGE UP (ref 7–14)
BUN SERPL-MCNC: 13 MG/DL — SIGNIFICANT CHANGE UP (ref 10–20)
CALCIUM SERPL-MCNC: 8.4 MG/DL — LOW (ref 8.5–10.1)
CHLORIDE SERPL-SCNC: 106 MMOL/L — SIGNIFICANT CHANGE UP (ref 98–110)
CO2 SERPL-SCNC: 25 MMOL/L — SIGNIFICANT CHANGE UP (ref 17–32)
CREAT SERPL-MCNC: 0.6 MG/DL — LOW (ref 0.7–1.5)
EGFR: 94 ML/MIN/1.73M2 — SIGNIFICANT CHANGE UP
GLUCOSE BLDC GLUCOMTR-MCNC: 227 MG/DL — HIGH (ref 70–99)
GLUCOSE SERPL-MCNC: 203 MG/DL — HIGH (ref 70–99)
HCT VFR BLD CALC: 34.2 % — LOW (ref 37–47)
HGB BLD-MCNC: 11.6 G/DL — LOW (ref 12–16)
MCHC RBC-ENTMCNC: 30 PG — SIGNIFICANT CHANGE UP (ref 27–31)
MCHC RBC-ENTMCNC: 33.9 G/DL — SIGNIFICANT CHANGE UP (ref 32–37)
MCV RBC AUTO: 88.4 FL — SIGNIFICANT CHANGE UP (ref 81–99)
NRBC # BLD: 0 /100 WBCS — SIGNIFICANT CHANGE UP (ref 0–0)
PLATELET # BLD AUTO: 153 K/UL — SIGNIFICANT CHANGE UP (ref 130–400)
POTASSIUM SERPL-MCNC: 3.8 MMOL/L — SIGNIFICANT CHANGE UP (ref 3.5–5)
POTASSIUM SERPL-SCNC: 3.8 MMOL/L — SIGNIFICANT CHANGE UP (ref 3.5–5)
RBC # BLD: 3.87 M/UL — LOW (ref 4.2–5.4)
RBC # FLD: 12.7 % — SIGNIFICANT CHANGE UP (ref 11.5–14.5)
SODIUM SERPL-SCNC: 141 MMOL/L — SIGNIFICANT CHANGE UP (ref 135–146)
WBC # BLD: 7.58 K/UL — SIGNIFICANT CHANGE UP (ref 4.8–10.8)
WBC # FLD AUTO: 7.58 K/UL — SIGNIFICANT CHANGE UP (ref 4.8–10.8)

## 2022-08-03 PROCEDURE — 99239 HOSP IP/OBS DSCHRG MGMT >30: CPT

## 2022-08-03 RX ORDER — POLYETHYLENE GLYCOL 3350 17 G/17G
17 POWDER, FOR SOLUTION ORAL
Qty: 0 | Refills: 0 | DISCHARGE
Start: 2022-08-03

## 2022-08-03 RX ORDER — CELECOXIB 200 MG/1
1 CAPSULE ORAL
Qty: 28 | Refills: 0
Start: 2022-08-03 | End: 2022-08-16

## 2022-08-03 RX ORDER — TRAMADOL HYDROCHLORIDE 50 MG/1
1 TABLET ORAL
Qty: 42 | Refills: 0
Start: 2022-08-03 | End: 2022-08-09

## 2022-08-03 RX ORDER — OXYCODONE HYDROCHLORIDE 5 MG/1
1 TABLET ORAL
Qty: 10 | Refills: 0
Start: 2022-08-03

## 2022-08-03 RX ORDER — SENNA PLUS 8.6 MG/1
2 TABLET ORAL
Qty: 0 | Refills: 0 | DISCHARGE
Start: 2022-08-03

## 2022-08-03 RX ORDER — ASPIRIN/CALCIUM CARB/MAGNESIUM 324 MG
1 TABLET ORAL
Qty: 70 | Refills: 0
Start: 2022-08-03 | End: 2022-09-06

## 2022-08-03 RX ORDER — ACETAMINOPHEN 500 MG
2 TABLET ORAL
Qty: 0 | Refills: 0 | DISCHARGE
Start: 2022-08-03 | End: 2022-08-17

## 2022-08-03 RX ORDER — ACETAMINOPHEN 500 MG
2 TABLET ORAL
Qty: 0 | Refills: 0 | DISCHARGE
Start: 2022-08-03

## 2022-08-03 RX ORDER — PANTOPRAZOLE SODIUM 20 MG/1
1 TABLET, DELAYED RELEASE ORAL
Qty: 30 | Refills: 0
Start: 2022-08-03 | End: 2022-09-01

## 2022-08-03 RX ADMIN — Medication 2: at 08:00

## 2022-08-03 RX ADMIN — Medication 650 MILLIGRAM(S): at 05:37

## 2022-08-03 RX ADMIN — Medication 650 MILLIGRAM(S): at 11:04

## 2022-08-03 RX ADMIN — Medication 81 MILLIGRAM(S): at 05:36

## 2022-08-03 RX ADMIN — Medication 15 MILLIGRAM(S): at 05:36

## 2022-08-03 RX ADMIN — Medication 25 MICROGRAM(S): at 05:36

## 2022-08-03 RX ADMIN — Medication 1 TABLET(S): at 11:04

## 2022-08-03 RX ADMIN — PANTOPRAZOLE SODIUM 40 MILLIGRAM(S): 20 TABLET, DELAYED RELEASE ORAL at 05:36

## 2022-08-03 NOTE — OCCUPATIONAL THERAPY INITIAL EVALUATION ADULT - GENERAL OBSERVATIONS, REHAB EVAL
9:06-9:31; Chart reviewed, ok to treat by Occupational Therapist as confirmed by RN Dino, Pt received in bedside chair +aquacel left knee +josi CABRERA in NAD. Pt in agreement with OT IE.

## 2022-08-03 NOTE — DISCHARGE NOTE PROVIDER - NSDCCPCAREPLAN_GEN_ALL_CORE_FT
PRINCIPAL DISCHARGE DIAGNOSIS  Diagnosis: Arthritis of left knee  Assessment and Plan of Treatment: Keep surgical site clean and dry, may remove dressing in 7  days . Call your surgeon if any wound drainage, redness , increasing pain, fevers over 101 or if you have any questions or concerns.  Ice pack to affected area q4-6h as needed   You may shower with the bandage on and once it is removed. Once it is removed  , do not scrub surgical site. Do not apply any lotions/moisturizers/creams to surgical site.  Call to make your  post op appointment if you do not have one already.

## 2022-08-03 NOTE — DISCHARGE NOTE PROVIDER - NSFTFHOMEHTHYNRD_GEN_ALL_CORE
Add [id number] 
Yes

## 2022-08-03 NOTE — DISCHARGE NOTE NURSING/CASE MANAGEMENT/SOCIAL WORK - PATIENT PORTAL LINK FT
You can access the FollowMyHealth Patient Portal offered by [facility] by registering at the following website: [email or web address]. By joining [facility]’s FollowMyHealth portal, you will also be able to view your health information using other applications (apps) compatible with our system.

## 2022-08-03 NOTE — OCCUPATIONAL THERAPY INITIAL EVALUATION ADULT - LEVEL OF INDEPENDENCE: TUB, REHAB EVAL
Pt advised to have family member present when performing transfer and to practice with home care therapist prior to attempting independently. Pt verbalized good understanding and agreement./minimum assist (75% patients effort)

## 2022-08-03 NOTE — PROGRESS NOTE ADULT - SUBJECTIVE AND OBJECTIVE BOX
77y Female POD #  1    S/P left Total Knee Arthroplasty     Patient seen and examined at bedside . The patient is awake and alert in NAD. No complaints of chest pain, SOB, N/V.  Pain is controlled, the patient has ambulated and is voiding. All questions were answered    PAST MEDICAL & SURGICAL HISTORY:  HTN (hypertension)    Diabetes  niddm    OA (osteoarthritis)    Obesity  bmi 30.1    Hypothyroid    H/O colonoscopy  2022    Previous back surgery    S/P surgical removal of pilonidal cyst    H/O shoulder surgery  right          MEDICATIONS  (STANDING):  acetaminophen     Tablet .. 650 milliGRAM(s) Oral every 6 hours  aspirin enteric coated 81 milliGRAM(s) Oral every 12 hours  celecoxib 200 milliGRAM(s) Oral every 12 hours  chlorhexidine 2% Cloths 1 Application(s) Topical <User Schedule>  dextrose 5%. 1000 milliLiter(s) (100 mL/Hr) IV Continuous <Continuous>  dextrose 5%. 1000 milliLiter(s) (50 mL/Hr) IV Continuous <Continuous>  dextrose 50% Injectable 25 Gram(s) IV Push once  dextrose 50% Injectable 12.5 Gram(s) IV Push once  dextrose 50% Injectable 25 Gram(s) IV Push once  glucagon  Injectable 1 milliGRAM(s) IntraMuscular once  insulin lispro (ADMELOG) corrective regimen sliding scale   SubCutaneous three times a day before meals  ketorolac   Injectable 15 milliGRAM(s) IV Push every 6 hours  levothyroxine 25 MICROGram(s) Oral daily  multivitamin 1 Tablet(s) Oral daily  pantoprazole    Tablet 40 milliGRAM(s) Oral before breakfast  polyethylene glycol 3350 17 Gram(s) Oral at bedtime  senna 2 Tablet(s) Oral at bedtime  sodium chloride 0.9%. 1000 milliLiter(s) (75 mL/Hr) IV Continuous <Continuous>    MEDICATIONS  (PRN):  aluminum hydroxide/magnesium hydroxide/simethicone Suspension 30 milliLiter(s) Oral four times a day PRN Indigestion  dextrose Oral Gel 15 Gram(s) Oral once PRN Blood Glucose LESS THAN 70 milliGRAM(s)/deciliter  magnesium hydroxide Suspension 30 milliLiter(s) Oral daily PRN Constipation  ondansetron Injectable 4 milliGRAM(s) IV Push every 6 hours PRN Nausea and/or Vomiting  oxyCODONE    IR 5 milliGRAM(s) Oral every 4 hours PRN breakthrough pain  traMADol 50 milliGRAM(s) Oral every 4 hours PRN Moderate Pain (4 - 6)        Vital Signs Last 24 Hrs  T(C): 36.9 (03 Aug 2022 05:32), Max: 37.3 (02 Aug 2022 22:00)  T(F): 98.4 (03 Aug 2022 05:32), Max: 99.2 (02 Aug 2022 22:00)  HR: 82 (03 Aug 2022 05:32) (69 - 83)  BP: 149/67 (03 Aug 2022 05:32) (95/54 - 149/67)  BP(mean): --  RR: 18 (03 Aug 2022 05:32) (12 - 18)  SpO2: 97% (02 Aug 2022 13:00) (95% - 97%)                          11.6   7.58  )-----------( 153      ( 03 Aug 2022 06:29 )             34.2     08-03    141  |  106  |  13  ----------------------------<  203<H>  3.8   |  25  |  0.6<L>    Ca    8.4<L>      03 Aug 2022 06:29                PE:  The patient was seen and examined at bedside          A&OX3, NAD          Aquacel  dressing C/D/I          Compartments soft, BLE SCD in place          NVI, SILT           A/P:     # POD #1       s/p left Total Knee Arthroplasty                 - OOB to Chair   -PT/OT - wbat  -Pain control - per pain protocol   -Incentive Spirometry   -DVT Prophylaxis - aspirin   -GI ppx- continue Protonix   -discharge planning                             
post op note     s/p left tkr pod 0   pt seen at bedside sitting in chair no complaints pain well controlled     Vital Signs Last 24 Hrs  T(C): 36.7 (02 Aug 2022 13:58), Max: 37.1 (02 Aug 2022 12:02)  T(F): 98 (02 Aug 2022 13:58), Max: 98.8 (02 Aug 2022 12:02)  HR: 70 (02 Aug 2022 13:58) (65 - 83)  BP: 118/58 (02 Aug 2022 13:58) (95/54 - 144/65)  BP(mean): --  RR: 17 (02 Aug 2022 13:58) (12 - 18)  SpO2: 97% (02 Aug 2022 13:00) (95% - 100%)    left knee: dressing in place c/d/i   nvid df/pf intact   calf soft nt   bettie and ipc x 2 in place

## 2022-08-03 NOTE — DISCHARGE NOTE NURSING/CASE MANAGEMENT/SOCIAL WORK - NSDCPEFALRISK_GEN_ALL_CORE
For information on Fall & Injury Prevention, visit: [email or web address] OR  [email or web address] OR  https://www.cdc.gov/steadi/patient.html

## 2022-08-03 NOTE — CONSULT NOTE ADULT - ASSESSMENT
# S/P left Total Knee Arthroplasty   - DVT per ortho  - incentive spirometer, PT  - pain control    #DM    # Hypothyroidism

## 2022-08-03 NOTE — DISCHARGE NOTE PROVIDER - HOSPITAL COURSE
77 year old female with a past medical history of dm, htn, hypothyroidism, was admitted for an elective Total Knee Arthroplasty. The patient tolerated surgery well with no intra/post operative complications. She was given intra/post operative antibiotics for infection prophylaxis and will be discharged on Aspirin 81mg BID for 6 weeks to lower the risk of blood clots. She worked with Physical Therapy while admitted to the hospital and is stable for discharge.

## 2022-08-03 NOTE — DISCHARGE NOTE PROVIDER - NSDCMRMEDTOKEN_GEN_ALL_CORE_FT
acetaminophen 325 mg oral tablet: 2 tab(s) orally every 6 hours  aspirin 81 mg oral delayed release tablet: 1 tab(s) orally every 12 hours  celecoxib 200 mg oral capsule: 1 cap(s) orally every 12 hours  levothyroxine 25 mcg (0.025 mg) oral tablet: 1 tab(s) orally once a day  metFORMIN 750 mg oral tablet, extended release: 1 tab(s) orally once a day  oxyCODONE 5 mg oral tablet: 1 tab(s) orally every 8 hours, As Needed -breakthrough pain MDD:3  pantoprazole 40 mg oral delayed release tablet: 1 tab(s) orally once a day (before a meal)  polyethylene glycol 3350 oral powder for reconstitution: 17 gram(s) orally once a day (at bedtime)  senna leaf extract oral tablet: 2 tab(s) orally once a day (at bedtime)  traMADol 50 mg oral tablet: 1 tab(s) orally every 4 hours, As needed, Moderate Pain (4 - 6) MDD:5

## 2022-08-03 NOTE — DISCHARGE NOTE PROVIDER - CARE PROVIDER_API CALL
Garrick Bonilla Markus  Orthopedic Surgery  [address] [place] [id number]  Phone: [phone]  Fax: [phone]  Follow Up Time:

## 2022-08-05 DIAGNOSIS — I10 ESSENTIAL (PRIMARY) HYPERTENSION: ICD-10-CM

## 2022-08-05 DIAGNOSIS — M17.12 UNILATERAL PRIMARY OSTEOARTHRITIS, LEFT KNEE: ICD-10-CM

## 2022-08-05 DIAGNOSIS — E11.9 TYPE 2 DIABETES MELLITUS WITHOUT COMPLICATIONS: ICD-10-CM

## 2022-08-05 DIAGNOSIS — E66.9 OBESITY, UNSPECIFIED: ICD-10-CM

## 2022-08-05 DIAGNOSIS — E03.9 HYPOTHYROIDISM, UNSPECIFIED: ICD-10-CM

## 2022-08-05 PROBLEM — Z98.890 OTHER SPECIFIED POSTPROCEDURAL STATES: Chronic | Status: ACTIVE | Noted: 2022-08-02

## 2022-08-05 PROBLEM — M19.90 UNSPECIFIED OSTEOARTHRITIS, UNSPECIFIED SITE: Chronic | Status: ACTIVE | Noted: 2022-07-19

## 2022-08-08 LAB — SURGICAL PATHOLOGY STUDY: SIGNIFICANT CHANGE UP

## 2022-08-16 ENCOUNTER — APPOINTMENT (OUTPATIENT)
Dept: ORTHOPEDIC SURGERY | Facility: CLINIC | Age: 77
End: 2022-08-16

## 2022-08-16 DIAGNOSIS — Z48.02 ENCOUNTER FOR REMOVAL OF SUTURES: ICD-10-CM

## 2022-08-16 PROCEDURE — 99024 POSTOP FOLLOW-UP VISIT: CPT

## 2022-08-16 NOTE — HISTORY OF PRESENT ILLNESS
[de-identified] : 77 year old female s/p left total knee replacement presents to the office today for her 2 week follow up. She is here today to for staple removal. Patient is ambulating with a cane today. She reports taking Tramadol and Tylenol for pain with good pain control. She has continued to do physical therapy at home and will be completing this within the week. Patient states she has been taking Aspirin 81 mg twice daily for DVT prophylaxis. Patient denies any fevers, chills, drainage from the incision site, chest pain, or shortness of breath.\par

## 2022-08-16 NOTE — ASSESSMENT
[FreeTextEntry1] : 77 year old female approximately two weeks status post left total knee replacement. Staples were removed from the incision site and steri-strips were applied. Pt was instructed she may shower, allowing the soap and water to run over the incision site, but not to directly scrub over the wound. Pt is to continue Aspirin 81 mg twice daily for DVT prophylaxis. Pt is to follow up in four weeks for reassessment. In the interim, if she develops any fevers, erythema, drainage from the incision site, or any concerning symptoms, we will see her prior to her scheduled appointment.\par

## 2022-08-16 NOTE — PHYSICAL EXAM
[Cane] : ambulates with cane [Normal] : Alert and in no acute distress [de-identified] : Left Knee\par Inspection: no effusion\par Wounds: Incision is clean and dry, staples are intact\par Alignment: normal.\par Palpation: no specific tenderness on palpation.\par ROM:0-90 degrees\par Muscle Test: good quad strength.\par Leg examination: calf is soft and non-tender. [de-identified] : Sensation grossly intact about bilateral lower extremities.\par

## 2022-09-12 ENCOUNTER — APPOINTMENT (OUTPATIENT)
Dept: ORTHOPEDIC SURGERY | Facility: CLINIC | Age: 77
End: 2022-09-12

## 2022-09-12 VITALS — TEMPERATURE: 97.9 F

## 2022-09-12 DIAGNOSIS — Z98.890 OTHER SPECIFIED POSTPROCEDURAL STATES: ICD-10-CM

## 2022-09-12 PROCEDURE — 99024 POSTOP FOLLOW-UP VISIT: CPT

## 2022-09-12 PROCEDURE — 73562 X-RAY EXAM OF KNEE 3: CPT | Mod: LT

## 2022-09-12 NOTE — PHYSICAL EXAM
[Normal] : Alert and in no acute distress [de-identified] : Left Knee\par Inspection: no effusion\par Wounds: healed midline incision, no drainage or erythema\par Alignment: normal.\par Palpation: no specific tenderness on palpation.\par ROM: 0-100 degrees\par Muscle Test: good quad strength.\par Leg examination: calf is soft and non-tender. [de-identified] : Radiographs done today, 3 views of the left knee shows a well aligned cemented PS TKA

## 2022-09-12 NOTE — ASSESSMENT
[FreeTextEntry1] : 77 year old female approximately 6 weeks s/p left total knee replacement. She ambulates with a cane today. Patient has good pain relief and is happy with her result so far. She should continue her home exercise regimen. Patient was advised she can stop taking Aspirin 81 mg for DVT prophylaxis. Patient is doing well. We can see her back in 6 weeks time for her 3 month follow up.\par

## 2022-09-12 NOTE — HISTORY OF PRESENT ILLNESS
[de-identified] : 77 year old female s/p left total knee replacement presents to the office today for her 6 week follow up. Patient did not continue PT outpatient but has been doing a home exercise program. She continues to notice swelling and is not in severe pain but there are times of discomfort. She is doing well with ambulation and continues to work on descending stairs. She reports taking Tylenol extra strength for pain PRN. She has been taking Aspirin 81 mg twice daily for DVT prophylaxis. Overall, patient reports doing well.

## 2022-11-02 ENCOUNTER — APPOINTMENT (OUTPATIENT)
Dept: ORTHOPEDIC SURGERY | Facility: CLINIC | Age: 77
End: 2022-11-02

## 2022-11-02 PROCEDURE — 99214 OFFICE O/P EST MOD 30 MIN: CPT

## 2022-11-02 PROCEDURE — 73562 X-RAY EXAM OF KNEE 3: CPT | Mod: LT

## 2022-11-02 NOTE — ASSESSMENT
[FreeTextEntry1] : S/p JALEN 8/2/2022 presents to the office for a 3 month follow up. She is doing very well. She is walking unlimited distances with out a limp or cane. She can negotiate stairs, get in and of chairs and cars. She has complete pain relief. She is extremely happy. She can f/u in 3 months

## 2022-11-02 NOTE — PHYSICAL EXAM
[Normal] : Alert and in no acute distress [de-identified] : Left Knee\par Inspection: no effusion\par Wounds: healed midline incision, no drainage or erythema\par Alignment: normal.\par Palpation: no specific tenderness on palpation.\par ROM: 0-100 degrees\par Muscle Test: good quad strength.\par Leg examination: calf is soft and non-tender. [de-identified] : Radiographs done today, 3 views of the left knee shows a well aligned cemented PS TKA

## 2022-11-03 ENCOUNTER — OUTPATIENT (OUTPATIENT)
Dept: OUTPATIENT SERVICES | Facility: HOSPITAL | Age: 77
LOS: 1 days | Discharge: HOME | End: 2022-11-03

## 2022-11-03 DIAGNOSIS — Z98.890 OTHER SPECIFIED POSTPROCEDURAL STATES: Chronic | ICD-10-CM

## 2022-11-03 DIAGNOSIS — Z12.31 ENCOUNTER FOR SCREENING MAMMOGRAM FOR MALIGNANT NEOPLASM OF BREAST: ICD-10-CM

## 2022-11-03 PROCEDURE — 77067 SCR MAMMO BI INCL CAD: CPT | Mod: 26

## 2022-11-03 PROCEDURE — 77063 BREAST TOMOSYNTHESIS BI: CPT | Mod: 26

## 2023-03-23 ENCOUNTER — OUTPATIENT (OUTPATIENT)
Dept: OUTPATIENT SERVICES | Facility: HOSPITAL | Age: 78
LOS: 1 days | End: 2023-03-23
Payer: MEDICARE

## 2023-03-23 DIAGNOSIS — Z98.890 OTHER SPECIFIED POSTPROCEDURAL STATES: Chronic | ICD-10-CM

## 2023-03-23 DIAGNOSIS — Z00.8 ENCOUNTER FOR OTHER GENERAL EXAMINATION: ICD-10-CM

## 2023-03-23 DIAGNOSIS — R22.9 LOCALIZED SWELLING, MASS AND LUMP, UNSPECIFIED: ICD-10-CM

## 2023-03-23 PROCEDURE — 76536 US EXAM OF HEAD AND NECK: CPT | Mod: 26

## 2023-03-23 PROCEDURE — 76536 US EXAM OF HEAD AND NECK: CPT

## 2023-03-24 DIAGNOSIS — R22.9 LOCALIZED SWELLING, MASS AND LUMP, UNSPECIFIED: ICD-10-CM

## 2023-04-20 ENCOUNTER — APPOINTMENT (OUTPATIENT)
Dept: ORTHOPEDIC SURGERY | Facility: CLINIC | Age: 78
End: 2023-04-20
Payer: MEDICARE

## 2023-04-20 DIAGNOSIS — M75.41 IMPINGEMENT SYNDROME OF RIGHT SHOULDER: ICD-10-CM

## 2023-04-20 DIAGNOSIS — M54.12 RADICULOPATHY, CERVICAL REGION: ICD-10-CM

## 2023-04-20 PROCEDURE — 99204 OFFICE O/P NEW MOD 45 MIN: CPT

## 2023-04-20 NOTE — HISTORY OF PRESENT ILLNESS
[de-identified] : Patient is here today for evaluation of her right shoulder and bothering her for 6 months she does have a history of diabetes last hemoglobin A1c was 6.4 fingerstick this morning was 138 comes in with her son today complains of numbness in the upper extremity as well as night pain in the shoulder, has a history of having shoulder surgery years ago, her son mention she does have a history of arthritis of her neck\par \par She is got good range of motion but pain with cuff resistance impingement and McCaskill's she also has pain and decreased range of motion of her neck\par \par X-rays reviewed from November last year on the hospital system show no soft tissue calcifications in the shoulder no arthritis of the shoulder\par \par Impression is cervical radiculopathy as well as impingement partial versus full-thickness cuff tear\par \par Recommend physical therapy she is already on the meloxicam and and we will see her back in 2 months if she still symptomatic would consider further imaging studies, her next evaluation will rule out any frozen shoulder which she does not have today I would also consider a cortisone shot

## 2023-06-20 ENCOUNTER — APPOINTMENT (OUTPATIENT)
Dept: ORTHOPEDIC SURGERY | Facility: CLINIC | Age: 78
End: 2023-06-20

## 2023-08-02 ENCOUNTER — APPOINTMENT (OUTPATIENT)
Dept: ORTHOPEDIC SURGERY | Facility: CLINIC | Age: 78
End: 2023-08-02
Payer: MEDICARE

## 2023-08-02 PROCEDURE — 73564 X-RAY EXAM KNEE 4 OR MORE: CPT | Mod: LT

## 2023-08-02 NOTE — PHYSICAL EXAM
[Normal] : Alert and in no acute distress [de-identified] : Left Knee\par  Inspection: no effusion\par  Wounds: healed midline incision, no drainage or erythema\par  Alignment: normal.\par  Palpation: no specific tenderness on palpation.\par  ROM: 0-100 degrees\par  Muscle Test: good quad strength.\par  Leg examination: calf is soft and non-tender. [de-identified] : Radiographs done today, 3 views of the left knee shows a well aligned cemented PS TKA

## 2023-08-02 NOTE — HISTORY OF PRESENT ILLNESS
[de-identified] : 78 year old female s/p left total knee replacement presents to the office today for her annual follow up. Patient denies any pain in her knee. She reports occasional lateral sided swelling, but denies any buckling, locking, or loss of motion. Patient reports being able to ambulate unlimited distances stating she usually walks about 1.6 miles on the treadmill. Patient states she has no issues with negotiating stairs. Patient reports ambulating without an assistive device and denies taking any medications for any pain in her knee. Overall, patient reports doing well.

## 2023-08-02 NOTE — ASSESSMENT
[FreeTextEntry1] : 78 year old female approximately one year s/p left total knee replacement. Patient has no complaints. She denies any pain. Patient is able to ambulate unlimited distances and negotiates stairs without an issue. She does not rely on an assistive device. Patient is enjoying  great result and very happy. She can follow up in 2 years time.

## 2023-08-22 ENCOUNTER — APPOINTMENT (OUTPATIENT)
Dept: ORTHOPEDIC SURGERY | Facility: CLINIC | Age: 78
End: 2023-08-22

## 2023-11-21 ENCOUNTER — APPOINTMENT (OUTPATIENT)
Dept: ORTHOPEDIC SURGERY | Facility: CLINIC | Age: 78
End: 2023-11-21
Payer: MEDICARE

## 2023-11-21 DIAGNOSIS — Z96.652 PRESENCE OF LEFT ARTIFICIAL KNEE JOINT: ICD-10-CM

## 2023-11-21 PROCEDURE — 99212 OFFICE O/P EST SF 10 MIN: CPT

## 2023-11-21 PROCEDURE — 73562 X-RAY EXAM OF KNEE 3: CPT | Mod: LT

## 2024-03-27 ENCOUNTER — OUTPATIENT (OUTPATIENT)
Dept: OUTPATIENT SERVICES | Facility: HOSPITAL | Age: 79
LOS: 1 days | End: 2024-03-27
Payer: MEDICARE

## 2024-03-27 DIAGNOSIS — Z98.890 OTHER SPECIFIED POSTPROCEDURAL STATES: Chronic | ICD-10-CM

## 2024-03-27 DIAGNOSIS — Z12.31 ENCOUNTER FOR SCREENING MAMMOGRAM FOR MALIGNANT NEOPLASM OF BREAST: ICD-10-CM

## 2024-03-27 PROCEDURE — 77063 BREAST TOMOSYNTHESIS BI: CPT | Mod: 26

## 2024-03-27 PROCEDURE — 77067 SCR MAMMO BI INCL CAD: CPT

## 2024-03-27 PROCEDURE — 77063 BREAST TOMOSYNTHESIS BI: CPT

## 2024-03-27 PROCEDURE — 77067 SCR MAMMO BI INCL CAD: CPT | Mod: 26

## 2024-03-28 DIAGNOSIS — Z12.31 ENCOUNTER FOR SCREENING MAMMOGRAM FOR MALIGNANT NEOPLASM OF BREAST: ICD-10-CM

## 2024-05-24 NOTE — ASU PREOP CHECKLIST - WAS PATIENT ON BETA BLOCKER?
Multiple attempts to reach pt and messages left with no return call.  Patient went in for HFU appt with [facility] on 5/24/24.  Encounter closing.     No

## 2024-07-22 ENCOUNTER — EMERGENCY (EMERGENCY)
Facility: HOSPITAL | Age: 79
LOS: 0 days | Discharge: HOME | End: 2024-07-22
Attending: EMERGENCY MEDICINE
Payer: MEDICARE

## 2024-07-22 VITALS
OXYGEN SATURATION: 98 % | RESPIRATION RATE: 18 BRPM | TEMPERATURE: 98 F | DIASTOLIC BLOOD PRESSURE: 76 MMHG | SYSTOLIC BLOOD PRESSURE: 156 MMHG | HEART RATE: 63 BPM

## 2024-07-22 DIAGNOSIS — S05.02XA INJURY OF CONJUNCTIVA AND CORNEAL ABRASION WITHOUT FOREIGN BODY, LEFT EYE, INITIAL ENCOUNTER: ICD-10-CM

## 2024-07-22 DIAGNOSIS — Z98.890 OTHER SPECIFIED POSTPROCEDURAL STATES: Chronic | ICD-10-CM

## 2024-07-22 DIAGNOSIS — Z98.49 CATARACT EXTRACTION STATUS, UNSPECIFIED EYE: ICD-10-CM
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
## 2024-07-22 DIAGNOSIS — Z88.0 ALLERGY STATUS TO PENICILLIN: ICD-10-CM

## 2024-07-22 DIAGNOSIS — I10 ESSENTIAL (PRIMARY) HYPERTENSION: ICD-10-CM

## 2024-07-22 DIAGNOSIS — E11.9 TYPE 2 DIABETES MELLITUS WITHOUT COMPLICATIONS: ICD-10-CM

## 2024-07-22 PROCEDURE — 99283 EMERGENCY DEPT VISIT LOW MDM: CPT

## 2024-07-22 PROCEDURE — 99284 EMERGENCY DEPT VISIT MOD MDM: CPT

## 2024-07-22 RX ORDER — POLYMYXIN B SULF/TRIMETHOPRIM
1 DROPS OPHTHALMIC (EYE) ONCE
Refills: 0 | Status: COMPLETED | OUTPATIENT
Start: 2024-07-22 | End: 2024-07-22

## 2024-07-22 RX ADMIN — Medication 1 DROP(S): at 18:17

## 2024-07-22 NOTE — ED PROVIDER NOTE - PATIENT PORTAL LINK FT
You can access the FollowMyHealth Patient Portal offered by [facility] by registering at the following website: [email or web address] By joining [employer]’s FollowMyHealth portal, you will also be able to view your health information using other applications (apps) compatible with our system.

## 2024-07-22 NOTE — ED PROVIDER NOTE - NSFOLLOWUPINSTRUCTIONS_ED_ALL_ED_FT
Our Emergency Department Referral Coordinators will be reaching out to you in the next 24-48 hours from 9:00am to 5:00pm with a follow up appointment. Please expect a phone call from the hospital in that time frame. If you do not receive a call or if you have any questions or concerns, you can reach them at   [phone]     Follow up with your ophthalmologist or ophthalmologist referral in 1-2 days. Use Polytrim eye drops 4 times per day while awake.    Corneal Abrasion    A corneal abrasion is a scratch or injury to the clear covering over the front of the eye (cornea). This can be painful. A corneal abrasion heals fast when it is treated. If this problem is not treated, it can get infected or affect eyesight (vision).    What are the causes?  A poke to the eye.  Something gritty or irritating in the eye.  Too much eye rubbing.  Very dry eyes.  Some eye infections.  Contact lenses that do not fit right or are worn for too long. You can also injure your cornea when putting in contact lenses or taking them out.  Eye surgery.  Certain cornea problems. These may make you more likely to get a corneal abrasion.  Sometimes, the cause is not known.    What are the signs or symptoms?  Eye pain. The pain may get worse when you open, close, or move your eye.  A feeling that something is poking your eye.  A feeling that something is stuck in your eye.  Tearing, redness, and sensitivity to light.  Having trouble keeping your eye open, or not being able to keep it open.  Blurry vision.  Headache.  How is this diagnosed?  This condition may be diagnosed based on your medical history, symptoms, and an eye exam. You may need to see an eye doctor (ophthalmologist or optometrist).    Before the eye exam, eye drops may be given to numb your eye. You may also get dye put in your eye. This helps the eye doctor see the injury better. After any drops or dye is put in the eye, the doctor will use a light or eye scope to diagnose the scratch or injury.    How is this treated?  Washing out your eye.  Taking out anything that is stuck in your eye.  Using antibiotic drops or ointment to treat or prevent an infection.  Using eye drops to lessen irritation, swelling, and pain.  Using steroid drops or ointment to treat redness, irritation, or swelling.  Applying a cold, wet cloth (cold compress) or ice pack to help with pain.  Taking pain medicines. This may include over-the-counter medicines like ibuprofen or stronger pain medicine like an opioid.  For large injuries or scratches, an eye patch or bandage soft contact lens might also be used. A bandage soft contact lens protects the cornea while it heals. These are not used if the injury was from wearing contact lenses. This is because you may be more likely to get an eye infection.    Follow these instructions at home:  Medicines    If you were prescribed antibiotic drops or ointment, use them as told by your doctor. You may be told to use:  Antibiotic eye drops or ointment. Do not stop using them even if you start to feel better.  Eye drops that moisten the eye (lubricating eye drops).  You may need to take these actions to prevent or treat trouble pooping (constipation):  Drink enough fluid to keep your pee (urine) pale yellow.  Take over-the-counter or prescription medicines.  Eat foods that are high in fiber. These include beans, whole grains, and fresh fruits and vegetables.  Limit foods that are high in fat and sugar. These include fried or sweet foods.  Ask your doctor if you should avoid driving or using machines while you are taking your medicine.  Take over-the-counter and prescription medicines only as told by your doctor.  Eye care    Rest your eyes.  Avoid bright light and intensely using (straining) your eyes. Wear sunglasses.  Do not rub or touch your eye. Do not wash out your eye.  Ask your doctor if you can use a cold compress on your eye to lessen pain.  If you have an eye patch:  Wear it as told by your doctor.  Follow your doctor's instructions about when to take it off.  If you have a bandage soft contact lens, your doctor will take it off during your next visit.  Do not wear your own contact lenses until your doctor says it is okay.  General instructions    Do not drive or use machines as told by your doctor. You may not be able to  distances as well if your eyesight is affected or if you are wearing an eye patch.  Keep all follow-up visits to help prevent infection and loss of eyesight.  Contact a doctor if:  You keep having eye pain and other symptoms for more than 2–3 days.  You have new symptoms, such as more redness, watery eyes, or fluid (discharge) coming from your eye.  Your eyelids get swollen.  Your eyesight gets much worse.  The fluid from your eye makes your eyelids stick together in the morning.  Your eye patch becomes so loose that you can blink your eye.  Symptoms come back after your eye heals.  Get help right away if:  You have very bad eye pain that does not get better with medicine.  You lose your eyesight.  This information is not intended to replace advice given to you by your health care provider. Make sure you discuss any questions you have with your health care provider.

## 2024-07-22 NOTE — ED PROVIDER NOTE - PHYSICAL EXAMINATION
Vital Signs: I have reviewed the initial vital signs.  CONSTITUTIONAL: Pt in no acute distress laying on stretcher.  SKIN: Skin exam is warm and dry, no acute rash.  HEAD: Normocephalic; atraumatic.  EYES: PERRL, EOM intact; +left injection with tearing. Visual Acuity intact 20/30 b/l. Fluorescin exam with scattered small areas of uptake. Negative fatimah.  ENT: No nasal discharge; airway clear.   NECK: Supple; FROM

## 2024-07-22 NOTE — ED PROVIDER NOTE - CLINICAL SUMMARY MEDICAL DECISION MAKING FREE TEXT BOX
Patient presenting with atraumatic left eye redness and irritation.  States that she was working in the garden when symptoms began, but denies trauma or debris getting in the eye.  Denies vision changes in the eye.  Otherwise afebrile, hemodynamically stable, neurovascularly intact, PERRL, EOMI, but positive conjunctival injection in the right eye.  Visual acuity is intact.  Fluorescein stain showed likely corneal abrasion but no other significant findings, negative Tutu sign.  Given the above, will discharge home with topical antibiotic drops and outpatient ophtho follow-up.  Patient agreeable with plan. Agrees to return to ED for any new or worsening symptoms.

## 2024-07-22 NOTE — ED PROCEDURE NOTE - GENERAL PROCEDURE DETAILS
Pt placed supine. Left eye anesthestized with 2 drops of tetracaine. Fluorescin strip applied and cornea inspected under blue light with scattered small areas of uptake.

## 2024-07-22 NOTE — ED PROVIDER NOTE - OBJECTIVE STATEMENT
79-year-old female past medical history HTN, diabetes, cataract repair, presents with left eye redness, irritation and tearing.  Patient states she was working in the yard prior to symptoms began.  Denies trauma or known debris getting into the eye.  Denies visual changes.  Patient states she does not wear contact lenses.

## 2024-10-14 ENCOUNTER — APPOINTMENT (OUTPATIENT)
Dept: ENDOCRINOLOGY | Facility: CLINIC | Age: 79
End: 2024-10-14
Payer: MEDICARE

## 2024-10-14 VITALS
OXYGEN SATURATION: 97 % | WEIGHT: 154 LBS | HEIGHT: 60 IN | HEART RATE: 74 BPM | DIASTOLIC BLOOD PRESSURE: 78 MMHG | SYSTOLIC BLOOD PRESSURE: 134 MMHG | RESPIRATION RATE: 18 BRPM | BODY MASS INDEX: 30.23 KG/M2

## 2024-10-14 DIAGNOSIS — I10 ESSENTIAL (PRIMARY) HYPERTENSION: ICD-10-CM

## 2024-10-14 DIAGNOSIS — E03.9 HYPOTHYROIDISM, UNSPECIFIED: ICD-10-CM

## 2024-10-14 DIAGNOSIS — E11.9 TYPE 2 DIABETES MELLITUS W/OUT COMPLICATIONS: ICD-10-CM

## 2024-10-14 DIAGNOSIS — Z87.39 PERSONAL HISTORY OF OTHER DISEASES OF THE MUSCULOSKELETAL SYSTEM AND CONNECTIVE TISSUE: ICD-10-CM

## 2024-10-14 DIAGNOSIS — Z78.9 OTHER SPECIFIED HEALTH STATUS: ICD-10-CM

## 2024-10-14 DIAGNOSIS — Z86.39 PERSONAL HISTORY OF OTHER ENDOCRINE, NUTRITIONAL AND METABOLIC DISEASE: ICD-10-CM

## 2024-10-14 PROCEDURE — 99204 OFFICE O/P NEW MOD 45 MIN: CPT

## 2024-10-14 RX ORDER — METFORMIN HYDROCHLORIDE 500 MG/1
500 TABLET, COATED ORAL
Qty: 180 | Refills: 3 | Status: ACTIVE | COMMUNITY
Start: 1900-01-01 | End: 1900-01-01

## 2024-10-14 RX ORDER — LEVOTHYROXINE SODIUM 0.05 MG/1
50 TABLET ORAL
Qty: 90 | Refills: 2 | Status: ACTIVE | COMMUNITY
Start: 1900-01-01 | End: 1900-01-01

## 2024-10-14 RX ORDER — LEVOTHYROXINE SODIUM 50 UG/1
50 TABLET ORAL
Refills: 0 | Status: ACTIVE | COMMUNITY

## 2025-02-19 NOTE — H&P PST ADULT - REASON FOR ADMISSION
Patient is a 77  year old  female presenting to PAST in preparation for   LEFT TOTAL KNEE REPLACEMENT on    8/2/22 under regional anesthesia by Dr. samson 19-Feb-2025 11:49

## 2025-02-24 ENCOUNTER — APPOINTMENT (OUTPATIENT)
Dept: ENDOCRINOLOGY | Facility: CLINIC | Age: 80
End: 2025-02-24
Payer: MEDICARE

## 2025-02-24 VITALS
WEIGHT: 154 LBS | HEART RATE: 70 BPM | OXYGEN SATURATION: 98 % | RESPIRATION RATE: 18 BRPM | BODY MASS INDEX: 30.23 KG/M2 | DIASTOLIC BLOOD PRESSURE: 74 MMHG | HEIGHT: 60 IN | SYSTOLIC BLOOD PRESSURE: 130 MMHG

## 2025-02-24 DIAGNOSIS — E03.9 HYPOTHYROIDISM, UNSPECIFIED: ICD-10-CM

## 2025-02-24 DIAGNOSIS — E11.9 TYPE 2 DIABETES MELLITUS W/OUT COMPLICATIONS: ICD-10-CM

## 2025-02-24 DIAGNOSIS — I10 ESSENTIAL (PRIMARY) HYPERTENSION: ICD-10-CM

## 2025-02-24 DIAGNOSIS — Z78.9 OTHER SPECIFIED HEALTH STATUS: ICD-10-CM

## 2025-02-24 DIAGNOSIS — K59.09 OTHER CONSTIPATION: ICD-10-CM

## 2025-02-24 DIAGNOSIS — E78.5 HYPERLIPIDEMIA, UNSPECIFIED: ICD-10-CM

## 2025-02-24 PROCEDURE — 99214 OFFICE O/P EST MOD 30 MIN: CPT

## 2025-02-24 RX ORDER — CHOLECALCIFEROL (VITAMIN D3) 25 MCG
TABLET,CHEWABLE ORAL
Refills: 0 | Status: ACTIVE | COMMUNITY

## 2025-02-24 RX ORDER — BLOOD SUGAR DIAGNOSTIC
STRIP MISCELLANEOUS
Qty: 300 | Refills: 3 | Status: ACTIVE | COMMUNITY
Start: 2025-02-24 | End: 1900-01-01

## 2025-02-24 RX ORDER — LANCETS 33 GAUGE
EACH MISCELLANEOUS
Qty: 300 | Refills: 3 | Status: ACTIVE | COMMUNITY
Start: 2025-02-24 | End: 1900-01-01

## 2025-03-24 ENCOUNTER — OUTPATIENT (OUTPATIENT)
Dept: OUTPATIENT SERVICES | Facility: HOSPITAL | Age: 80
LOS: 1 days | End: 2025-03-24
Payer: MEDICARE

## 2025-03-24 DIAGNOSIS — Z98.890 OTHER SPECIFIED POSTPROCEDURAL STATES: Chronic | ICD-10-CM

## 2025-03-24 DIAGNOSIS — Z12.31 ENCOUNTER FOR SCREENING MAMMOGRAM FOR MALIGNANT NEOPLASM OF BREAST: ICD-10-CM

## 2025-03-24 PROCEDURE — 77063 BREAST TOMOSYNTHESIS BI: CPT

## 2025-03-24 PROCEDURE — 77063 BREAST TOMOSYNTHESIS BI: CPT | Mod: 26

## 2025-03-24 PROCEDURE — 77067 SCR MAMMO BI INCL CAD: CPT | Mod: 26

## 2025-03-24 PROCEDURE — 77067 SCR MAMMO BI INCL CAD: CPT

## 2025-03-25 DIAGNOSIS — Z12.31 ENCOUNTER FOR SCREENING MAMMOGRAM FOR MALIGNANT NEOPLASM OF BREAST: ICD-10-CM

## 2025-04-05 ENCOUNTER — INPATIENT (INPATIENT)
Facility: HOSPITAL | Age: 80
LOS: 4 days | Discharge: HOME | DRG: 418 | End: 2025-04-10
Attending: SURGERY | Admitting: SURGERY
Payer: MEDICARE

## 2025-04-05 VITALS
SYSTOLIC BLOOD PRESSURE: 170 MMHG | OXYGEN SATURATION: 98 % | DIASTOLIC BLOOD PRESSURE: 77 MMHG | HEART RATE: 83 BPM | RESPIRATION RATE: 17 BRPM | WEIGHT: 151.9 LBS | TEMPERATURE: 99 F

## 2025-04-05 DIAGNOSIS — Z98.890 OTHER SPECIFIED POSTPROCEDURAL STATES: Chronic | ICD-10-CM

## 2025-04-05 DIAGNOSIS — K80.80 OTHER CHOLELITHIASIS WITHOUT OBSTRUCTION: ICD-10-CM

## 2025-04-05 LAB
ALBUMIN SERPL ELPH-MCNC: 3.4 G/DL — LOW (ref 3.5–5.2)
ALBUMIN SERPL ELPH-MCNC: 3.9 G/DL — SIGNIFICANT CHANGE UP (ref 3.5–5.2)
ALP SERPL-CCNC: 312 U/L — HIGH (ref 30–115)
ALP SERPL-CCNC: 393 U/L — HIGH (ref 30–115)
ALT FLD-CCNC: 395 U/L — HIGH (ref 0–41)
ALT FLD-CCNC: 564 U/L — HIGH (ref 0–41)
ANION GAP SERPL CALC-SCNC: 11 MMOL/L — SIGNIFICANT CHANGE UP (ref 7–14)
ANION GAP SERPL CALC-SCNC: 15 MMOL/L — HIGH (ref 7–14)
APPEARANCE UR: CLEAR — SIGNIFICANT CHANGE UP
APTT BLD: 27.4 SEC — SIGNIFICANT CHANGE UP (ref 27–39.2)
AST SERPL-CCNC: 187 U/L — HIGH (ref 0–41)
AST SERPL-CCNC: 356 U/L — HIGH (ref 0–41)
B-OH-BUTYR SERPL-SCNC: 0.8 MMOL/L — HIGH
BASOPHILS # BLD AUTO: 0.02 K/UL — SIGNIFICANT CHANGE UP (ref 0–0.2)
BASOPHILS # BLD AUTO: 0.03 K/UL — SIGNIFICANT CHANGE UP (ref 0–0.2)
BASOPHILS NFR BLD AUTO: 0.3 % — SIGNIFICANT CHANGE UP (ref 0–1)
BASOPHILS NFR BLD AUTO: 0.3 % — SIGNIFICANT CHANGE UP (ref 0–1)
BILIRUB DIRECT SERPL-MCNC: 3.9 MG/DL — HIGH (ref 0–0.3)
BILIRUB DIRECT SERPL-MCNC: 5.2 MG/DL — HIGH (ref 0–0.3)
BILIRUB INDIRECT FLD-MCNC: 0.9 MG/DL — SIGNIFICANT CHANGE UP (ref 0.2–1.2)
BILIRUB SERPL-MCNC: 4.8 MG/DL — HIGH (ref 0.2–1.2)
BILIRUB SERPL-MCNC: 7.1 MG/DL — HIGH (ref 0.2–1.2)
BILIRUB UR-MCNC: NEGATIVE — SIGNIFICANT CHANGE UP
BUN SERPL-MCNC: 11 MG/DL — SIGNIFICANT CHANGE UP (ref 10–20)
BUN SERPL-MCNC: 15 MG/DL — SIGNIFICANT CHANGE UP (ref 10–20)
CALCIUM SERPL-MCNC: 8.4 MG/DL — SIGNIFICANT CHANGE UP (ref 8.4–10.5)
CALCIUM SERPL-MCNC: 8.7 MG/DL — SIGNIFICANT CHANGE UP (ref 8.4–10.5)
CHLORIDE SERPL-SCNC: 107 MMOL/L — SIGNIFICANT CHANGE UP (ref 98–110)
CHLORIDE SERPL-SCNC: 94 MMOL/L — LOW (ref 98–110)
CO2 SERPL-SCNC: 23 MMOL/L — SIGNIFICANT CHANGE UP (ref 17–32)
CO2 SERPL-SCNC: 23 MMOL/L — SIGNIFICANT CHANGE UP (ref 17–32)
COLOR SPEC: YELLOW — SIGNIFICANT CHANGE UP
CREAT SERPL-MCNC: 0.6 MG/DL — LOW (ref 0.7–1.5)
CREAT SERPL-MCNC: 0.7 MG/DL — SIGNIFICANT CHANGE UP (ref 0.7–1.5)
DIFF PNL FLD: NEGATIVE — SIGNIFICANT CHANGE UP
EGFR: 89 ML/MIN/1.73M2 — SIGNIFICANT CHANGE UP
EGFR: 89 ML/MIN/1.73M2 — SIGNIFICANT CHANGE UP
EGFR: 92 ML/MIN/1.73M2 — SIGNIFICANT CHANGE UP
EGFR: 92 ML/MIN/1.73M2 — SIGNIFICANT CHANGE UP
EOSINOPHIL # BLD AUTO: 0.07 K/UL — SIGNIFICANT CHANGE UP (ref 0–0.7)
EOSINOPHIL # BLD AUTO: 0.1 K/UL — SIGNIFICANT CHANGE UP (ref 0–0.7)
EOSINOPHIL NFR BLD AUTO: 1 % — SIGNIFICANT CHANGE UP (ref 0–8)
EOSINOPHIL NFR BLD AUTO: 1.2 % — SIGNIFICANT CHANGE UP (ref 0–8)
GAS PNL BLDV: SIGNIFICANT CHANGE UP
GLUCOSE BLDC GLUCOMTR-MCNC: 128 MG/DL — HIGH (ref 70–99)
GLUCOSE BLDC GLUCOMTR-MCNC: 140 MG/DL — HIGH (ref 70–99)
GLUCOSE SERPL-MCNC: 125 MG/DL — HIGH (ref 70–99)
GLUCOSE SERPL-MCNC: 154 MG/DL — HIGH (ref 70–99)
GLUCOSE UR QL: NEGATIVE MG/DL — SIGNIFICANT CHANGE UP
HCT VFR BLD CALC: 34.5 % — LOW (ref 37–47)
HCT VFR BLD CALC: 39.6 % — SIGNIFICANT CHANGE UP (ref 37–47)
HGB BLD-MCNC: 11.7 G/DL — LOW (ref 12–16)
HGB BLD-MCNC: 13.6 G/DL — SIGNIFICANT CHANGE UP (ref 12–16)
IMM GRANULOCYTES NFR BLD AUTO: 0.6 % — HIGH (ref 0.1–0.3)
IMM GRANULOCYTES NFR BLD AUTO: 0.7 % — HIGH (ref 0.1–0.3)
INR BLD: 1.14 RATIO — SIGNIFICANT CHANGE UP (ref 0.65–1.3)
KETONES UR-MCNC: ABNORMAL MG/DL
LEUKOCYTE ESTERASE UR-ACNC: ABNORMAL
LIDOCAIN IGE QN: 31 U/L — SIGNIFICANT CHANGE UP (ref 7–60)
LYMPHOCYTES # BLD AUTO: 0.61 K/UL — LOW (ref 1.2–3.4)
LYMPHOCYTES # BLD AUTO: 0.71 K/UL — LOW (ref 1.2–3.4)
LYMPHOCYTES # BLD AUTO: 10.3 % — LOW (ref 20.5–51.1)
LYMPHOCYTES # BLD AUTO: 7.3 % — LOW (ref 20.5–51.1)
MAGNESIUM SERPL-MCNC: 1.8 MG/DL — SIGNIFICANT CHANGE UP (ref 1.8–2.4)
MCHC RBC-ENTMCNC: 29.9 PG — SIGNIFICANT CHANGE UP (ref 27–31)
MCHC RBC-ENTMCNC: 30.2 PG — SIGNIFICANT CHANGE UP (ref 27–31)
MCHC RBC-ENTMCNC: 33.9 G/DL — SIGNIFICANT CHANGE UP (ref 32–37)
MCHC RBC-ENTMCNC: 34.3 G/DL — SIGNIFICANT CHANGE UP (ref 32–37)
MCV RBC AUTO: 87.8 FL — SIGNIFICANT CHANGE UP (ref 81–99)
MCV RBC AUTO: 88.2 FL — SIGNIFICANT CHANGE UP (ref 81–99)
MONOCYTES # BLD AUTO: 0.61 K/UL — HIGH (ref 0.1–0.6)
MONOCYTES # BLD AUTO: 0.96 K/UL — HIGH (ref 0.1–0.6)
MONOCYTES NFR BLD AUTO: 10.3 % — HIGH (ref 1.7–9.3)
MONOCYTES NFR BLD AUTO: 9.8 % — HIGH (ref 1.7–9.3)
NEUTROPHILS # BLD AUTO: 4.59 K/UL — SIGNIFICANT CHANGE UP (ref 1.4–6.5)
NEUTROPHILS # BLD AUTO: 7.91 K/UL — HIGH (ref 1.4–6.5)
NEUTROPHILS NFR BLD AUTO: 77.2 % — HIGH (ref 42.2–75.2)
NEUTROPHILS NFR BLD AUTO: 81 % — HIGH (ref 42.2–75.2)
NITRITE UR-MCNC: NEGATIVE — SIGNIFICANT CHANGE UP
NRBC BLD AUTO-RTO: 0 /100 WBCS — SIGNIFICANT CHANGE UP (ref 0–0)
NRBC BLD AUTO-RTO: 0 /100 WBCS — SIGNIFICANT CHANGE UP (ref 0–0)
PH UR: 7 — SIGNIFICANT CHANGE UP (ref 5–8)
PHOSPHATE SERPL-MCNC: 2.7 MG/DL — SIGNIFICANT CHANGE UP (ref 2.1–4.9)
PLATELET # BLD AUTO: 181 K/UL — SIGNIFICANT CHANGE UP (ref 130–400)
PLATELET # BLD AUTO: 211 K/UL — SIGNIFICANT CHANGE UP (ref 130–400)
PMV BLD: 9.1 FL — SIGNIFICANT CHANGE UP (ref 7.4–10.4)
PMV BLD: 9.3 FL — SIGNIFICANT CHANGE UP (ref 7.4–10.4)
POTASSIUM SERPL-MCNC: 3.7 MMOL/L — SIGNIFICANT CHANGE UP (ref 3.5–5)
POTASSIUM SERPL-MCNC: 3.8 MMOL/L — SIGNIFICANT CHANGE UP (ref 3.5–5)
POTASSIUM SERPL-SCNC: 3.7 MMOL/L — SIGNIFICANT CHANGE UP (ref 3.5–5)
POTASSIUM SERPL-SCNC: 3.8 MMOL/L — SIGNIFICANT CHANGE UP (ref 3.5–5)
PROT SERPL-MCNC: 5.2 G/DL — LOW (ref 6–8)
PROT SERPL-MCNC: 6.4 G/DL — SIGNIFICANT CHANGE UP (ref 6–8)
PROT UR-MCNC: NEGATIVE MG/DL — SIGNIFICANT CHANGE UP
PROTHROM AB SERPL-ACNC: 13.5 SEC — HIGH (ref 9.95–12.87)
RBC # BLD: 3.91 M/UL — LOW (ref 4.2–5.4)
RBC # BLD: 4.51 M/UL — SIGNIFICANT CHANGE UP (ref 4.2–5.4)
RBC # FLD: 13.2 % — SIGNIFICANT CHANGE UP (ref 11.5–14.5)
RBC # FLD: 13.3 % — SIGNIFICANT CHANGE UP (ref 11.5–14.5)
SODIUM SERPL-SCNC: 132 MMOL/L — LOW (ref 135–146)
SODIUM SERPL-SCNC: 141 MMOL/L — SIGNIFICANT CHANGE UP (ref 135–146)
SP GR SPEC: 1.02 — SIGNIFICANT CHANGE UP (ref 1–1.03)
T3 SERPL-MCNC: 71 NG/DL — LOW (ref 80–200)
T4 AB SER-ACNC: 10.1 UG/DL — SIGNIFICANT CHANGE UP (ref 4.6–12)
TSH SERPL-MCNC: 1.59 UIU/ML — SIGNIFICANT CHANGE UP (ref 0.27–4.2)
UROBILINOGEN FLD QL: 0.2 MG/DL — SIGNIFICANT CHANGE UP (ref 0.2–1)
WBC # BLD: 5.94 K/UL — SIGNIFICANT CHANGE UP (ref 4.8–10.8)
WBC # BLD: 9.77 K/UL — SIGNIFICANT CHANGE UP (ref 4.8–10.8)
WBC # FLD AUTO: 5.94 K/UL — SIGNIFICANT CHANGE UP (ref 4.8–10.8)
WBC # FLD AUTO: 9.77 K/UL — SIGNIFICANT CHANGE UP (ref 4.8–10.8)

## 2025-04-05 PROCEDURE — 88304 TISSUE EXAM BY PATHOLOGIST: CPT

## 2025-04-05 PROCEDURE — 80076 HEPATIC FUNCTION PANEL: CPT

## 2025-04-05 PROCEDURE — 86900 BLOOD TYPING SEROLOGIC ABO: CPT

## 2025-04-05 PROCEDURE — 82962 GLUCOSE BLOOD TEST: CPT

## 2025-04-05 PROCEDURE — 93010 ELECTROCARDIOGRAM REPORT: CPT

## 2025-04-05 PROCEDURE — 80074 ACUTE HEPATITIS PANEL: CPT

## 2025-04-05 PROCEDURE — S2900: CPT

## 2025-04-05 PROCEDURE — 99284 EMERGENCY DEPT VISIT MOD MDM: CPT | Mod: FS

## 2025-04-05 PROCEDURE — 99285 EMERGENCY DEPT VISIT HI MDM: CPT

## 2025-04-05 PROCEDURE — 85730 THROMBOPLASTIN TIME PARTIAL: CPT

## 2025-04-05 PROCEDURE — 80048 BASIC METABOLIC PNL TOTAL CA: CPT

## 2025-04-05 PROCEDURE — 83735 ASSAY OF MAGNESIUM: CPT

## 2025-04-05 PROCEDURE — 85025 COMPLETE CBC W/AUTO DIFF WBC: CPT

## 2025-04-05 PROCEDURE — C1889: CPT

## 2025-04-05 PROCEDURE — 84100 ASSAY OF PHOSPHORUS: CPT

## 2025-04-05 PROCEDURE — 85610 PROTHROMBIN TIME: CPT

## 2025-04-05 PROCEDURE — C1769: CPT

## 2025-04-05 PROCEDURE — 86901 BLOOD TYPING SEROLOGIC RH(D): CPT

## 2025-04-05 PROCEDURE — 76705 ECHO EXAM OF ABDOMEN: CPT | Mod: 26

## 2025-04-05 PROCEDURE — 36415 COLL VENOUS BLD VENIPUNCTURE: CPT

## 2025-04-05 PROCEDURE — 86850 RBC ANTIBODY SCREEN: CPT

## 2025-04-05 PROCEDURE — 74177 CT ABD & PELVIS W/CONTRAST: CPT | Mod: 26

## 2025-04-05 RX ORDER — LEVOTHYROXINE SODIUM 300 MCG
50 TABLET ORAL DAILY
Refills: 0 | Status: DISCONTINUED | OUTPATIENT
Start: 2025-04-05 | End: 2025-04-09

## 2025-04-05 RX ORDER — LEVOTHYROXINE SODIUM 300 MCG
1 TABLET ORAL
Refills: 0 | DISCHARGE

## 2025-04-05 RX ORDER — METRONIDAZOLE 250 MG
500 TABLET ORAL EVERY 12 HOURS
Refills: 0 | Status: DISCONTINUED | OUTPATIENT
Start: 2025-04-06 | End: 2025-04-08

## 2025-04-05 RX ORDER — METRONIDAZOLE 250 MG
500 TABLET ORAL ONCE
Refills: 0 | Status: COMPLETED | OUTPATIENT
Start: 2025-04-05 | End: 2025-04-05

## 2025-04-05 RX ORDER — MAGNESIUM SULFATE 500 MG/ML
2 SYRINGE (ML) INJECTION ONCE
Refills: 0 | Status: COMPLETED | OUTPATIENT
Start: 2025-04-05 | End: 2025-04-05

## 2025-04-05 RX ORDER — DEXTROSE 50 % IN WATER 50 %
15 SYRINGE (ML) INTRAVENOUS ONCE
Refills: 0 | Status: DISCONTINUED | OUTPATIENT
Start: 2025-04-05 | End: 2025-04-05

## 2025-04-05 RX ORDER — METFORMIN HYDROCHLORIDE 850 MG/1
1 TABLET ORAL
Refills: 0 | DISCHARGE

## 2025-04-05 RX ORDER — PIPERACILLIN-TAZO-DEXTROSE,ISO 3.375G/5
3.38 IV SOLUTION, PIGGYBACK PREMIX FROZEN(ML) INTRAVENOUS ONCE
Refills: 0 | Status: DISCONTINUED | OUTPATIENT
Start: 2025-04-05 | End: 2025-04-05

## 2025-04-05 RX ORDER — SODIUM CHLORIDE 9 G/1000ML
1000 INJECTION, SOLUTION INTRAVENOUS
Refills: 0 | Status: DISCONTINUED | OUTPATIENT
Start: 2025-04-05 | End: 2025-04-07

## 2025-04-05 RX ORDER — DEXTROSE 50 % IN WATER 50 %
12.5 SYRINGE (ML) INTRAVENOUS ONCE
Refills: 0 | Status: DISCONTINUED | OUTPATIENT
Start: 2025-04-05 | End: 2025-04-05

## 2025-04-05 RX ORDER — DEXTROSE 50 % IN WATER 50 %
25 SYRINGE (ML) INTRAVENOUS ONCE
Refills: 0 | Status: DISCONTINUED | OUTPATIENT
Start: 2025-04-05 | End: 2025-04-05

## 2025-04-05 RX ORDER — KETOROLAC TROMETHAMINE 30 MG/ML
15 INJECTION, SOLUTION INTRAMUSCULAR; INTRAVENOUS EVERY 6 HOURS
Refills: 0 | Status: DISCONTINUED | OUTPATIENT
Start: 2025-04-05 | End: 2025-04-09

## 2025-04-05 RX ORDER — GLUCAGON 3 MG/1
1 POWDER NASAL ONCE
Refills: 0 | Status: DISCONTINUED | OUTPATIENT
Start: 2025-04-05 | End: 2025-04-09

## 2025-04-05 RX ORDER — INSULIN LISPRO 100 U/ML
INJECTION, SOLUTION INTRAVENOUS; SUBCUTANEOUS
Refills: 0 | Status: DISCONTINUED | OUTPATIENT
Start: 2025-04-05 | End: 2025-04-09

## 2025-04-05 RX ORDER — METRONIDAZOLE 250 MG
TABLET ORAL
Refills: 0 | Status: DISCONTINUED | OUTPATIENT
Start: 2025-04-05 | End: 2025-04-08

## 2025-04-05 RX ORDER — ACETAMINOPHEN 500 MG/5ML
650 LIQUID (ML) ORAL EVERY 6 HOURS
Refills: 0 | Status: DISCONTINUED | OUTPATIENT
Start: 2025-04-05 | End: 2025-04-09

## 2025-04-05 RX ORDER — DEXTROSE 50 % IN WATER 50 %
25 SYRINGE (ML) INTRAVENOUS ONCE
Refills: 0 | Status: DISCONTINUED | OUTPATIENT
Start: 2025-04-05 | End: 2025-04-09

## 2025-04-05 RX ORDER — SODIUM CHLORIDE 9 G/1000ML
1000 INJECTION, SOLUTION INTRAVENOUS
Refills: 0 | Status: DISCONTINUED | OUTPATIENT
Start: 2025-04-05 | End: 2025-04-05

## 2025-04-05 RX ORDER — ENOXAPARIN SODIUM 100 MG/ML
40 INJECTION SUBCUTANEOUS EVERY 24 HOURS
Refills: 0 | Status: DISCONTINUED | OUTPATIENT
Start: 2025-04-05 | End: 2025-04-09

## 2025-04-05 RX ORDER — CEFTRIAXONE 500 MG/1
1000 INJECTION, POWDER, FOR SOLUTION INTRAMUSCULAR; INTRAVENOUS EVERY 24 HOURS
Refills: 0 | Status: DISCONTINUED | OUTPATIENT
Start: 2025-04-05 | End: 2025-04-08

## 2025-04-05 RX ADMIN — Medication 1000 MILLILITER(S): at 10:00

## 2025-04-05 RX ADMIN — Medication 100 MILLIGRAM(S): at 15:27

## 2025-04-05 RX ADMIN — Medication 20 MILLIEQUIVALENT(S): at 23:36

## 2025-04-05 RX ADMIN — SODIUM CHLORIDE 110 MILLILITER(S): 9 INJECTION, SOLUTION INTRAVENOUS at 17:13

## 2025-04-05 RX ADMIN — Medication 650 MILLIGRAM(S): at 17:43

## 2025-04-05 RX ADMIN — Medication 1000 MILLILITER(S): at 11:00

## 2025-04-05 RX ADMIN — Medication 650 MILLIGRAM(S): at 17:13

## 2025-04-05 RX ADMIN — Medication 25 GRAM(S): at 23:37

## 2025-04-05 NOTE — H&P ADULT - NSHPLABSRESULTS_GEN_ALL_CORE
LAB/STUDIES:                        13.6   9.77  )-----------( 211      ( 2025 10:51 )             39.6         132[L]  |  94[L]  |  15  ----------------------------<  154[H]  3.7   |  23  |  0.7    Ca    8.7      2025 10:51    TPro  x   /  Alb  x   /  TBili  x   /  DBili  5.2[H]  /  AST  x   /  ALT  x   /  AlkPhos  x         LIVER FUNCTIONS - ( 2025 10:51 )  Alb: 3.9 g/dL / Pro: 6.4 g/dL / ALK PHOS: 393 U/L / ALT: 564 U/L / AST: 356 U/L / GGT: x           Urinalysis Basic - ( 2025 13:30 )    Color: Yellow / Appearance: Clear / S.019 / pH: x  Gluc: x / Ketone: Trace mg/dL  / Bili: Negative / Urobili: 0.2 mg/dL   Blood: x / Protein: Negative mg/dL / Nitrite: Negative   Leuk Esterase: Small / RBC: 1 /HPF / WBC 2 /HPF   Sq Epi: x / Non Sq Epi: 2 /HPF / Bacteria: Negative /HPF                  Urinalysis with Rflx Culture (collected 2025 13:30)      IMAGING:  < from: US Abdomen Upper Quadrant Right (25 @ 12:59) >    1.  Cholelithiasis (prominent stone near the gallbladder neck) without   sonographic evidence of acute cholecystitis.  2.  CBD upper limits of normal.  3.  Fatty liver.    < from: CT Abdomen and Pelvis w/ IV Cont (25 @ 11:15) >  No definite CT evidence of acute pathology.

## 2025-04-05 NOTE — H&P ADULT - ATTENDING COMMENTS
ACS Attending Note Attestation    Patient is examined and evaluated at the bedside with the residents/PAs. Treatment plan discussed with the team, nurses, and consulting physicians and consulting teams. Medications, radiological studies and all other relevant studies reviewed. I reviewed the resident/PA note and agreed with above assessment and plan with following additions and corrections.    HENRI ROBERTS Patient is a 79y old  Female who presents with a chief complaint of   Physical Exam:  Radiological studies reviewed by me with following noted:  Allergies    amoxicillin (Urticaria)    Intolerances      PAST MEDICAL & SURGICAL HISTORY:  Diabetes  niddm      Hypothyroid      H/O colonoscopy  2022      Previous back surgery      S/P surgical removal of pilonidal cyst      H/O shoulder surgery  right        Vital Signs Last 24 Hrs  T(C): 37.1 (05 Apr 2025 17:29), Max: 37.1 (05 Apr 2025 17:29)  T(F): 98.8 (05 Apr 2025 17:29), Max: 98.8 (05 Apr 2025 17:29)  HR: 88 (05 Apr 2025 17:29) (82 - 88)  BP: 134/64 (05 Apr 2025 17:29) (130/63 - 170/77)  BP(mean): 87 (05 Apr 2025 17:29) (87 - 87)  RR: 18 (05 Apr 2025 17:29) (17 - 19)  SpO2: 96% (05 Apr 2025 17:29) (96% - 99%)    Parameters below as of 05 Apr 2025 17:29  Patient On (Oxygen Delivery Method): room air                            13.6   9.77  )-----------( 211      ( 05 Apr 2025 10:51 )             39.6     04-05    132[L]  |  94[L]  |  15  ----------------------------<  154[H]  3.7   |  23  |  0.7    Ca    8.7      05 Apr 2025 10:51    TPro  x   /  Alb  x   /  TBili  x   /  DBili  5.2[H]  /  AST  x   /  ALT  x   /  AlkPhos  x   04-05    79yF w/ PMHx of  DM and hypothyroidism  who presented with jaundice associated with RUQ pain and nausea. Physical exam findings, imaging, and labs as documented above.     PLAN:  - Admit to surgery for choledocholithiasis   - Advance GI consult for ERCP   - CLD   - Zosyn   - Pain control: tylenol/toradol   - Insulin sliding scale         Casandra Flores MD, FACS  Trauma/ACS/Surgical Critical Care Attending

## 2025-04-05 NOTE — H&P ADULT - ASSESSMENT
ASSESSMENT:  79yF w/ PMHx of  DM and hypothyroidism  who presented with jaundice associated with RUQ pain and nausea. Physical exam findings, imaging, and labs as documented above.     PLAN:  - Admit to surgery for choledocholithiasis   - Advance GI consult for ERCP   - CLD   - Zosyn   - Pain control: tylenol/toradol   - Insulin sliding scale         Above plan discussed with Attending Surgeon Dr. Flores , patient, patient family, and Primary team  04-05-25 @ 14:29

## 2025-04-05 NOTE — PATIENT PROFILE ADULT - NSPROSPHOSPCHAPLAINYN_GEN_A_NUR
Bedside shift change report given to [address] [place] [address] (oncoming nurse) by Brad Petty RN (offgoing nurse). Report included the following information SBAR, Kardex, MAR, and Recent Results. no

## 2025-04-05 NOTE — ED PROVIDER NOTE - OBJECTIVE STATEMENT
79-year-old female with past medical history of hypothyroidism, osteoarthritis, diabetes, hypertension presenting with complaint of jaundice.  Patient reports yesterday she took her levothyroxine and metformin following which she took 1 alcoholic beverage and noticed her face turned yellow.  Symptoms resolved overnight and again this morning patient noticed symptoms returned after taking her levothyroxine.  Patient denies any abdominal pain at this time but reports she had some abdominal pain and nausea yesterday which is since resolved.  No fever, headache, dizziness, lightness, chest pain, shortness of breath, nausea, vomiting, diarrhea, constipation,  symptoms.  Patient has no other complaints at this time.

## 2025-04-05 NOTE — ED ADULT NURSE NOTE - HISTORY OF COVID-19 VACCINATION
Heart Failure Clinic       Visit Date: 8/9/2022  Cardiologist:  Dr. Lotus Sharma  Primary Care Physician: Dr. Reese Villafana MD    Alycia Velazquez is a 82 y.o. female who presents today for:  Chief Complaint   Patient presents with    Congestive Heart Failure       HPI:     TYPE HF: HFrEF 30-35% w/ severe MR  Cause: ischemic/sever MR  Device: none  HX: Hypothyroidism, DM  Dry Wt:        Alycia Velazquez is a 82 y.o. female who presents to the office for a new patient visit in the heart failure clinic. Concerns today: pt here today for a pre-mitral clip evaluation. Pt is seen by Dr. Lotus Sharma in [place] [address]. In may she was admitted to the hospital for near syncope and SOB. She had also been feeling lightheaded. Pt was found to have a low EF and she had a cath planned for June as well. Since she has started on Toprol and losartan and states she feels much better. Resolved SOB, orthopnea and improved energy. She does her ADLs w/o difficulty. Hospitalization:         Admission date: 5/25/2022  Discharge date: 5/27/2022     Discharge Diagnoses:    Principal Problem:    Near syncope  Active Problems:    Severe mitral regurgitation by prior echocardiogram    Idiopathic cardiomyopathy (HCC)    Diabetes mellitus ([address]    Hypertension    Hypothyroidism    Probable prerenal azotemia - however no baseline creatinine level in Epic    WEI (acute kidney injury) ([address]    Severe pulmonary hypertension (HCC)    Mild-Severe tricuspid valve regurgitation    Mild mitral stenosis    Aortic stenosis, severe    Dilated cardiomyopathy (HCC) - EF 35-40%  Resolved Problems:    * No resolved hospital problems.  *      Patient has:  Chest Pain: no  SOB: resolved  Orthopnea/PND: resolved  SHRUTHI: none  Edema: none  Fatigue: none  Abdominal bloating: none  Cough: none  Appetite: good      Past Medical History:   Diagnosis Date    WEI (acute kidney injury) ([address] 05/26/2022    Aortic stenosis, severe 05/26/2022    Diabetes mellitus ([address] activity change, appetite change and fatigue. Respiratory:  Negative for cough, shortness of breath and wheezing. Cardiovascular:  Negative for chest pain, palpitations and leg swelling. Gastrointestinal:  Negative for abdominal distention and abdominal pain. Musculoskeletal:  Negative for gait problem. Neurological:  Negative for weakness, light-headedness and headaches. Psychiatric/Behavioral:  Negative for sleep disturbance. OBJECTIVE:   Today's Vitals:  BP (!) 94/52   Pulse 76   Ht 5' 4\" (1.626 m)   Wt 110 lb (49.9 kg)   SpO2 96%   BMI 18.88 kg/m²     Physical Exam  Vitals reviewed. Constitutional:       General: She is not in acute distress. Appearance: Normal appearance. She is well-developed. She is not diaphoretic. HENT:      Head: Normocephalic and atraumatic. Eyes:      Conjunctiva/sclera: Conjunctivae normal.   Cardiovascular:      Rate and Rhythm: Normal rate and regular rhythm. Heart sounds: Normal heart sounds. No murmur heard. Pulmonary:      Effort: Pulmonary effort is normal. No respiratory distress. Breath sounds: Normal breath sounds. No wheezing, rhonchi or rales. Abdominal:      General: Bowel sounds are normal. There is no distension. Palpations: Abdomen is soft. Tenderness: There is no abdominal tenderness. Musculoskeletal:         General: Normal range of motion. Cervical back: Normal range of motion and neck supple. Right lower leg: No edema. Left lower leg: No edema. Skin:     General: Skin is warm and dry. Capillary Refill: Capillary refill takes less than 2 seconds. Neurological:      Mental Status: She is alert and oriented to person, place, and time.       Coordination: Coordination normal.   Psychiatric:         Behavior: Behavior normal.       Wt Readings from Last 3 Encounters:   08/09/22 110 lb (49.9 kg)   08/09/22 110 lb (49.9 kg)   08/03/22 112 lb (50.8 kg)     BP Readings from Last 3 Encounters: 08/09/22 (!) 94/52 08/09/22 (!) 94/52 08/03/22 123/77     Pulse Readings from Last 3 Encounters:   08/09/22 76   08/09/22 76   08/03/22 73     Body mass index is 18.88 kg/m². ECHO:       Summary   Probe easily inserted by Cardiologist.   Procedure performed without complications. The transesophageal approach was used. Risk benefits and alternatives were explained to the patient and informed   consent was obtained. Mild-moderately dilated left ventricular size and severely reduced   systolic function. There was severe global hypokinesis. Wall thickness was within normal limits. Ejection fraction was estimated at 30-35%. Left atrial size was severely dilated with no thrombus identified. APPENDAGE: The left atrial appendage size was normal with no thrombus   identified. DOPPLER: The function was normal (normal emptying velocity). The mitral valve structure demonstrated bileaflet thickening, PMVL   restriction, malcoaptation. DOPPLER: The transmitral velocity was within   the normal range with no evidence for mitral stenosis (mean gradient 4   mmHg--related to high flow). 3D analysis confirms the above, there also   maybe a cleft between P1 and P2; majority of regurgitation is on the   lateral side of A2-P2. There was severe mitral regurgitation (EROA = 0.4   cm2, RV 71 cc). Signature      ----------------------------------------------------------------   Electronically signed by Julio Cesar Gonzalez MD (Interpreting   physician) on 06/24/2022 at 04:54 PM   ----------------------------------------------------------------       CATH/STRESS:    Severe three vessel disease involving the LAD, D1 branch of the LAD and    of the proximal circumflex and right coronary arteries. Normal LVEDP.    Patient also with known Severe mitral regurgitation with an EF of 30-35%      Recommendations      Consult to interventional cardiology for likely angioplasty and/or   stenting of the patients severe stenosis and/or consideration of mitral   valve clipping as the patient is not open to open heart surgery. Signature      ----------------------------------------------------------------   Electronically signed by Grant Sosa(Performing Physician) on   06/09/2022 12:14   ----------------------------------------------------------------      Results reviewed:  BNP: No results found for: BNP  CBC:   Lab Results   Component Value Date/Time    WBC 6.3 07/12/2022 09:39 AM    RBC 4.70 07/12/2022 09:39 AM    HGB 12.5 07/12/2022 09:39 AM    HCT 41.2 07/12/2022 09:39 AM     07/12/2022 09:39 AM     CMP:    Lab Results   Component Value Date/Time     08/09/2022 11:38 AM    K 4.4 08/09/2022 11:38 AM     08/09/2022 11:38 AM    CO2 22 08/09/2022 11:38 AM    BUN 20 08/09/2022 11:38 AM    CREATININE 1.4 08/09/2022 11:38 AM    GFRAA 27 07/12/2022 09:39 AM    LABGLOM 36 08/09/2022 11:38 AM    GLUCOSE 98 08/09/2022 11:38 AM    CALCIUM 9.6 08/09/2022 11:38 AM     Hepatic Function Panel:    Lab Results   Component Value Date/Time    ALKPHOS 78 07/12/2022 09:39 AM    ALT 21 07/12/2022 09:39 AM    AST 24 07/12/2022 09:39 AM    PROT 6.6 07/12/2022 09:39 AM    BILITOT 0.66 07/12/2022 09:39 AM    LABALBU 4.2 07/12/2022 09:39 AM     Magnesium:  No results found for: MG  PT/INR:  No results found for: PROTIME, INR  Lipids:    Lab Results   Component Value Date/Time    TRIG 83 07/12/2022 09:38 AM    HDL 26 07/12/2022 09:38 AM       ASSESSMENT AND PLAN:   The patient's condition/symptoms are stable        Diagnosis Orders   1. Severe mitral regurgitation  Basic Metabolic Panel    Magnesium      2.  Chronic systolic congestive heart failure, NYHA class 2 (HCC)  Basic Metabolic Panel        Continue:  GDMT:   ACE/ARB/ARNI - Losartan 25 daily   BB - Toprol 25 Daily   Diuretic - none  AA - none  SGLT2 -  none  Vasodilator - none  Other - synthroid       Severe Mitral Regurgitation w/ reduced EF  Considering ischemic   Stable, no fluid on exam today. Pt notes significant improvement with starting her BB and Losartan. GDMT: repeating labs before starting. Lab reviewed - K 4.4 Cr 1.4 Mag   ECHO 2022: severe MR  CATH planned for 8/17: PCI and Mitral clip    Repeat blood work today    No med changes today     Continue diet/fluid adherence  Continue daily wts. F/U w/ Cardiology  F/U in clinic in [place]      Tolerating above noted HF meds, no ill side effects noted. Will continue to monitor kidney function and electrolytes. Will optimize as tolerated. Pt is compliant w/ medications. Total visit time of 45 minutes has been spent with patient on education of symptoms, management, medication, and plan of care; as well as review of chart: labs, ECHO, radiology reports, etc.   I personally spent more then 50% of the appt time face to face with the patient. Daily weights  Fluid restriction of 2 Liters per day  Limit sodium in diet to around 4187-3112 mg/day  Monitor BP  Activity as tolerated     Patient was instructed to call the [facility] for any changes in symptoms as noted in AVS.      Return if symptoms worsen or fail to improve. or sooner if needed     Patient given educational materials - see patient instructions. We discussed the importance of weighing oneself and recording daily. We also discussed the importance of a low sodium diet, higher sodium foods to avoid and better low sodium food options. Patient verbalizes understanding of plan of care using teach back method, and is agreeable to the treatment plan.        Electronically signed by SHANNON Kyle CNP on 8/9/2022 at 12:59 PM No

## 2025-04-05 NOTE — ED PROVIDER NOTE - DIFFERENTIAL DIAGNOSIS
Differential Diagnosis Differential includes but is not limited to :  colitis, pancreatitis, UTI, Diverticulitis, SBO, biliary colic, cholecystitis, cholangitis, pyelonephritis, aortic dissection, cholydoco

## 2025-04-05 NOTE — ED PROVIDER NOTE - CLINICAL SUMMARY MEDICAL DECISION MAKING FREE TEXT BOX
79-year-old here for jaundice.  Scleral icterus noted.  Right upper quadrant tenderness noted on exam.  Labs ultrasound CT done.  No evidence of pancreatic cancer/obstructive pathology on CT.  Ultrasound positive for cholecystitis/cholelithiasis/stone at neck of bladder with elevated LFTs/bili.  CBD mildly elevated.  Surgery consulted.  Likely admission surgery versus medicine.    I personally evaluated patient. I agree with the findings and plan with all documentation on chart except as documented  in my note.      ED work up reviewed and results and plan of care discussed with patient. Patient requires admission for further work up, monitoring, and management. Need for admission discussed with patient.

## 2025-04-05 NOTE — ED PROVIDER NOTE - PHYSICAL EXAMINATION
Constitutional: Well developed, well nourished, no acute distress  Head: Normocephalic, Atraumatic  Eyes: PERRLA, EOMI, Yellow conjunctiva  ENT: Moist mucous membranes, no rhinorrhea,   Neck: Supple, Nontender,   Respiratory: Normal chest excursion with respiration; Breath sounds clear and equal B/L; No wheezes, rales, or rhonchi   Cardiovascular: RRR; Normal S1, S2; No murmurs, rubs or gallops   ABD/GI:  ; Nondistended; Mild right upper quadrant abdominal tenderness with no Beatty sign. No guarding, rigidity or rebound; No CVA tenderness  EXT/MS: Moving all extremities; Distal pulses 2+ B/L; No peripheral edema  Skin:  Jaundice face  Neurologic: AAO x 4;  Normal motor and sensory function  Psychiatric: Appropriate affect, normal mood

## 2025-04-05 NOTE — H&P ADULT - NSHPPHYSICALEXAM_GEN_ALL_CORE
VITALS:  T(F): 98.6 (04-05-25 @ 10:30), Max: 98.6 (04-05-25 @ 10:30)  HR: 83 (04-05-25 @ 10:30) (83 - 83)  BP: 170/77 (04-05-25 @ 10:30) (170/77 - 170/77)  RR: 17 (04-05-25 @ 10:30) (17 - 17)  SpO2: 98% (04-05-25 @ 10:30) (98% - 98%)    PHYSICAL EXAM:  General: NAD, AAOx3, calm and cooperative  HEENT: NCAT, SUAD, EOMI,   Cardiac: RRR S1, S2,   Respiratory: CTAB, normal respiratory effort, breath sounds equal BL,   Abdomen: Soft, non-distended, RUQ tender, no rebound, no guarding.  Skin: Warm/dry, normal color, jaundiced

## 2025-04-05 NOTE — H&P ADULT - HISTORY OF PRESENT ILLNESS
Patient: HENRI ROBERTS , 79y ([birth year])Female   MRN: [id number]  Location: [facility] ED Hold 010 A  Visit: 04-05-25 Inpatient  Date: 04-05-25 @ 14:29    HPI: 79 year old female with PMH of DM and hypothyroidism, presents with jaundice. Patient reports that after dinner last night she noticed her skin "looking yellow", which subsided before bed. Patient noticed jaundice again after breakfast today. Patient reports intermittent RUQ pain and nausea, denies any emesis. At bedside examination, patient is tender to RUQ and jaundiced.

## 2025-04-05 NOTE — ED PROVIDER NOTE - INTERNATIONAL TRAVEL
From: Abe Bean  To: David Barnett  Sent: 10/12/2021 11:58 AM CDT  Subject: appointment tomorrow at 8:30    do I need to fast for this appointment?  
See My Penny message.   
No

## 2025-04-06 LAB
ALBUMIN SERPL ELPH-MCNC: 3.4 G/DL — LOW (ref 3.5–5.2)
ALP SERPL-CCNC: 349 U/L — HIGH (ref 30–115)
ALT FLD-CCNC: 271 U/L — HIGH (ref 0–41)
ANION GAP SERPL CALC-SCNC: 11 MMOL/L — SIGNIFICANT CHANGE UP (ref 7–14)
AST SERPL-CCNC: 95 U/L — HIGH (ref 0–41)
BASOPHILS # BLD AUTO: 0.01 K/UL — SIGNIFICANT CHANGE UP (ref 0–0.2)
BASOPHILS NFR BLD AUTO: 0.2 % — SIGNIFICANT CHANGE UP (ref 0–1)
BILIRUB DIRECT SERPL-MCNC: 2.5 MG/DL — HIGH (ref 0–0.3)
BILIRUB INDIRECT FLD-MCNC: 0.6 MG/DL — SIGNIFICANT CHANGE UP (ref 0.2–1.2)
BILIRUB SERPL-MCNC: 3.1 MG/DL — HIGH (ref 0.2–1.2)
BUN SERPL-MCNC: 8 MG/DL — LOW (ref 10–20)
CALCIUM SERPL-MCNC: 8.2 MG/DL — LOW (ref 8.4–10.5)
CHLORIDE SERPL-SCNC: 108 MMOL/L — SIGNIFICANT CHANGE UP (ref 98–110)
CO2 SERPL-SCNC: 22 MMOL/L — SIGNIFICANT CHANGE UP (ref 17–32)
CREAT SERPL-MCNC: 0.7 MG/DL — SIGNIFICANT CHANGE UP (ref 0.7–1.5)
EGFR: 89 ML/MIN/1.73M2 — SIGNIFICANT CHANGE UP
EGFR: 89 ML/MIN/1.73M2 — SIGNIFICANT CHANGE UP
EOSINOPHIL # BLD AUTO: 0.08 K/UL — SIGNIFICANT CHANGE UP (ref 0–0.7)
EOSINOPHIL NFR BLD AUTO: 1.4 % — SIGNIFICANT CHANGE UP (ref 0–8)
GLUCOSE BLDC GLUCOMTR-MCNC: 126 MG/DL — HIGH (ref 70–99)
GLUCOSE BLDC GLUCOMTR-MCNC: 138 MG/DL — HIGH (ref 70–99)
GLUCOSE BLDC GLUCOMTR-MCNC: 143 MG/DL — HIGH (ref 70–99)
GLUCOSE BLDC GLUCOMTR-MCNC: 148 MG/DL — HIGH (ref 70–99)
GLUCOSE SERPL-MCNC: 131 MG/DL — HIGH (ref 70–99)
HCT VFR BLD CALC: 33.8 % — LOW (ref 37–47)
HGB BLD-MCNC: 11.6 G/DL — LOW (ref 12–16)
IMM GRANULOCYTES NFR BLD AUTO: 0.7 % — HIGH (ref 0.1–0.3)
LYMPHOCYTES # BLD AUTO: 1.01 K/UL — LOW (ref 1.2–3.4)
LYMPHOCYTES # BLD AUTO: 17.7 % — LOW (ref 20.5–51.1)
MAGNESIUM SERPL-MCNC: 1.8 MG/DL — SIGNIFICANT CHANGE UP (ref 1.8–2.4)
MCHC RBC-ENTMCNC: 30.3 PG — SIGNIFICANT CHANGE UP (ref 27–31)
MCHC RBC-ENTMCNC: 34.3 G/DL — SIGNIFICANT CHANGE UP (ref 32–37)
MCV RBC AUTO: 88.3 FL — SIGNIFICANT CHANGE UP (ref 81–99)
MONOCYTES # BLD AUTO: 0.69 K/UL — HIGH (ref 0.1–0.6)
MONOCYTES NFR BLD AUTO: 12.1 % — HIGH (ref 1.7–9.3)
NEUTROPHILS # BLD AUTO: 3.88 K/UL — SIGNIFICANT CHANGE UP (ref 1.4–6.5)
NEUTROPHILS NFR BLD AUTO: 67.9 % — SIGNIFICANT CHANGE UP (ref 42.2–75.2)
NRBC BLD AUTO-RTO: 0 /100 WBCS — SIGNIFICANT CHANGE UP (ref 0–0)
PHOSPHATE SERPL-MCNC: 2.9 MG/DL — SIGNIFICANT CHANGE UP (ref 2.1–4.9)
PLATELET # BLD AUTO: 187 K/UL — SIGNIFICANT CHANGE UP (ref 130–400)
PMV BLD: 9.2 FL — SIGNIFICANT CHANGE UP (ref 7.4–10.4)
POTASSIUM SERPL-MCNC: 3.9 MMOL/L — SIGNIFICANT CHANGE UP (ref 3.5–5)
POTASSIUM SERPL-SCNC: 3.9 MMOL/L — SIGNIFICANT CHANGE UP (ref 3.5–5)
PROT SERPL-MCNC: 5.1 G/DL — LOW (ref 6–8)
RBC # BLD: 3.83 M/UL — LOW (ref 4.2–5.4)
RBC # FLD: 13.3 % — SIGNIFICANT CHANGE UP (ref 11.5–14.5)
SODIUM SERPL-SCNC: 141 MMOL/L — SIGNIFICANT CHANGE UP (ref 135–146)
WBC # BLD: 5.71 K/UL — SIGNIFICANT CHANGE UP (ref 4.8–10.8)
WBC # FLD AUTO: 5.71 K/UL — SIGNIFICANT CHANGE UP (ref 4.8–10.8)

## 2025-04-06 PROCEDURE — 99232 SBSQ HOSP IP/OBS MODERATE 35: CPT | Mod: FS

## 2025-04-06 RX ADMIN — Medication 650 MILLIGRAM(S): at 11:36

## 2025-04-06 RX ADMIN — Medication 100 MILLIGRAM(S): at 05:08

## 2025-04-06 RX ADMIN — SODIUM CHLORIDE 110 MILLILITER(S): 9 INJECTION, SOLUTION INTRAVENOUS at 17:19

## 2025-04-06 RX ADMIN — Medication 650 MILLIGRAM(S): at 17:49

## 2025-04-06 RX ADMIN — Medication 100 MILLIGRAM(S): at 18:18

## 2025-04-06 RX ADMIN — CEFTRIAXONE 100 MILLIGRAM(S): 500 INJECTION, POWDER, FOR SOLUTION INTRAMUSCULAR; INTRAVENOUS at 17:19

## 2025-04-06 RX ADMIN — ENOXAPARIN SODIUM 40 MILLIGRAM(S): 100 INJECTION SUBCUTANEOUS at 17:19

## 2025-04-06 RX ADMIN — Medication 650 MILLIGRAM(S): at 11:06

## 2025-04-06 RX ADMIN — Medication 63.75 MILLIMOLE(S): at 00:56

## 2025-04-06 RX ADMIN — Medication 50 MICROGRAM(S): at 05:08

## 2025-04-06 RX ADMIN — Medication 650 MILLIGRAM(S): at 17:19

## 2025-04-06 NOTE — PROGRESS NOTE ADULT - ASSESSMENT
79yF w/ PMHx of  DM and hypothyroidism  who presented with jaundice associated with RUQ pain and nausea.     PLAN:  - Advance GI consult for ERCP   - CLD   - Zosyn   - Pain control: tylenol/toradol   - Insulin sliding scale

## 2025-04-06 NOTE — CONSULT NOTE ADULT - SUBJECTIVE AND OBJECTIVE BOX
Gastroenterology Consultation:    Patient is a 79y old  Female who presents with a chief complaint of       Admitted on: 04-05-25      HPI: 79 year old female with PMH of DM and hypothyroidism, presents with jaundice. Patient reports that after dinner last night she noticed her skin "looking yellow", which subsided before bed. Patient noticed jaundice again after breakfast today. Patient reports intermittent RUQ pain and nausea, denies any emesis. Patient states that abdominal pain has resolved. Liver enzymes and bilirubin elevated. (T jaret 4.8       ). RUQUS showing Cholelithiasis (prominent stone near the gallbladder neck) without sonographic evidence of acute cholecystitis. CBD upper limits of normal. Fatty liver. CTAP shows LIVER: Steatosis. BILE DUCTS: Normal caliber. GALLBLADDER: Cholelithiasis. GI consulted for choledocholithiasis         Prior EGD:  Impressions:    Esophageal hiatal hernia.    Esophagitis in the gastroesophageal junction compatible with non-erosive  esophagitis. (Biopsy).    Erythema in the whole stomach compatible with non-erosive gastritis. (Biopsy).    3 gastric polyps 5-8 mm were noticed in the gastric body, likely inflammatory  polyps, biopsies were taken from these polyps to rule out adenoma.    Normal mucosa in the whole examined duodenum. (Biopsy).     Prior Colonoscopy:  none in chart      PAST MEDICAL & SURGICAL HISTORY:  Diabetes  niddm      Hypothyroid      H/O colonoscopy  2022      Previous back surgery      S/P surgical removal of pilonidal cyst      H/O shoulder surgery  right            FAMILY HISTORY:      Social History:  Tobacco:denies  Alcohol:denies  Drugs:denies    Home Medications:  levothyroxine 50 mcg (0.05 mg) oral capsule: 1 cap(s) orally once a day (05 Apr 2025 14:36)  metFORMIN 500 mg oral tablet: 1 tab(s) orally 2 times a day (05 Apr 2025 14:35)        MEDICATIONS  (STANDING):  acetaminophen     Tablet .. 650 milliGRAM(s) Oral every 6 hours  cefTRIAXone   IVPB 1000 milliGRAM(s) IV Intermittent every 24 hours  dextrose 50% Injectable 25 Gram(s) IV Push once  enoxaparin Injectable 40 milliGRAM(s) SubCutaneous every 24 hours  glucagon  Injectable 1 milliGRAM(s) IntraMuscular once  insulin lispro (ADMELOG) corrective regimen sliding scale   SubCutaneous three times a day before meals  lactated ringers. 1000 milliLiter(s) (110 mL/Hr) IV Continuous <Continuous>  levothyroxine 50 MICROGram(s) Oral daily  metroNIDAZOLE  IVPB 500 milliGRAM(s) IV Intermittent every 12 hours  metroNIDAZOLE  IVPB        MEDICATIONS  (PRN):  ketorolac   Injectable 15 milliGRAM(s) IV Push every 6 hours PRN Moderate Pain (4 - 6)      Allergies  amoxicillin (Urticaria)      Review of Systems:   Constitutional:  No Fever, No Chills  ENT/Mouth:  No Hearing Changes,  No Difficulty Swallowing  Eyes:  No Eye Pain, No Vision Changes  Cardiovascular:  No Chest Pain, No Palpitations  Respiratory:  No Cough, No Dyspnea  Gastrointestinal:  As described in HPI  Musculoskeletal:  No Joint Swelling, No Back Pain  Skin:  No Skin Lesions, No Jaundice  Neuro:  No Syncope, No Dizziness  Heme/Lymph:  No Bruising, No Bleeding.          Physical Examination:  T(C): 36.7 (04-06-25 @ 07:20), Max: 37.4 (04-05-25 @ 23:30)  HR: 85 (04-06-25 @ 07:20) (80 - 88)  BP: 146/78 (04-06-25 @ 07:20) (129/73 - 146/78)  RR: 18 (04-06-25 @ 07:20) (18 - 19)  SpO2: 98% (04-06-25 @ 07:20) (96% - 99%)      04-05-25 @ 07:01  -  04-06-25 @ 07:00  --------------------------------------------------------  IN: 1100 mL / OUT: 0 mL / NET: 1100 mL          GENERAL: AAOx3, no acute distress.  HEAD:  Atraumatic, Normocephalic  EYES: conjunctiva and sclera clear  NECK: Supple, no JVD or thyromegaly  CHEST/LUNG: Clear to auscultation bilaterally; No wheeze, rhonchi, or rales  HEART: Regular rate and rhythm; normal S1, S2, No murmurs.  ABDOMEN: Soft, nontender, nondistended; Bowel sounds present  NEUROLOGY: No asterixis or tremor.   SKIN: Intact, no jaundice        Data:                        11.7   5.94  )-----------( 181      ( 05 Apr 2025 20:00 )             34.5     Hgb Trend:  11.7  04-05-25 @ 20:00  13.6  04-05-25 @ 10:51        04-05    141  |  107  |  11  ----------------------------<  125[H]  3.8   |  23  |  0.6[L]    Ca    8.4      05 Apr 2025 20:00  Phos  2.7     04-05  Mg     1.8     04-05    TPro  5.2[L]  /  Alb  3.4[L]  /  TBili  4.8[H]  /  DBili  3.9[H]  /  AST  187[H]  /  ALT  395[H]  /  AlkPhos  312[H]  04-05    Liver panel trend:  TBili 4.8   /      /      /   AlkP 312   /   Tptn 5.2   /   Alb 3.4    /   DBili 3.9      04-05  TBili --   /   AST --   /   ALT --   /   AlkP --   /   Tptn --   /   Alb --    /   DBili 5.2      04-05  TBili 7.1   /      /      /   AlkP 393   /   Tptn 6.4   /   Alb 3.9    /   DBili --      04-05      PT/INR - ( 05 Apr 2025 20:00 )   PT: 13.50 sec;   INR: 1.14 ratio         PTT - ( 05 Apr 2025 20:00 )  PTT:27.4 sec    Urinalysis with Rflx Culture (collected 05 Apr 2025 13:30)          Radiology:  CT Abdomen and Pelvis w/ IV Cont:   ACC: [id number] EXAM:  CT ABDOMEN AND PELVIS IC   ORDERED BY: CLAUDIA UP     PROCEDURE DATE:  04/05/2025          INTERPRETATION:  CLINICAL INFORMATION: Jaundice    COMPARISON: None.    CONTRAST/COMPLICATIONS:  IV Contrast: Omnipaque 350  100 cc administered   0 cc discarded  Oral Contrast: NONE    PROCEDURE:  CT of the Abdomen and Pelvis was performed.  Sagittal and coronal reformats were performed.    FINDINGS:  LOWER CHEST: Bibasilar subsegmental atelectasis. Bilateral breast   punctate hyperdensities. Mitral annular calcifications    LIVER: Steatosis.  BILE DUCTS: Normal caliber.  GALLBLADDER: Cholelithiasis.  SPLEEN: Within normal limits.  PANCREAS: Within normal limits.  ADRENALS: Nonspecific left adrenal gland thickening. Right adrenal gland   is unremarkable.  KIDNEYS/URETERS: Within normal limits.    BLADDER: Within normal limits.  REPRODUCTIVE ORGANS: Calcified fibroid uterus.    BOWEL: No bowel obstruction. Colonic diverticulosis. Appendix is normal.   Small hiatal hernia.  PERITONEUM/RETROPERITONEUM: Within normal limits.  VESSELS: Atherosclerotic changes.  LYMPH NODES: No lymphadenopathy.  ABDOMINAL WALL: Fat-containing umbilical hernia.  BONES: Degenerative changes.    IMPRESSION:  No definite CT evidence of acute pathology.        --- End of Report ---          TAYLOR EDWARD MD; Resident Radiologist  This document has been electronically signed.  HARI MG MD; Attending Radiologist  This document has been electronically signed. Apr 5 2025 12:34PM (04-05-25 @ 11:15)    US Abdomen Upper Quadrant Right:   ACC: [id number] EXAM:  US ABDOMEN RT UPR QUADRANT   ORDERED BY: CLAUDIA UP     PROCEDURE DATE:  04/05/2025          INTERPRETATION:  CLINICAL INFORMATION: Right upper quadrant abdominal   pain.    COMPARISON: 7/12/2020    TECHNIQUE: Sonography of the right upper quadrant.    FINDINGS:  Liver: Echogenic liver parenchyma  Bile ducts: Normal caliber. Common bile duct measures 8 mm.  Gallbladder: Cholelithiasis. No pericholecystic fluid. Negative   sonographic Beatty sign.  Pancreas: Visualized portions are within normal limits.  Right kidney: 11.1 cm. No hydronephrosis.  Ascites: None.  IVC: Visualized portions are within normal limits.    IMPRESSION:  1.  Cholelithiasis (prominent stone near the gallbladder neck) without   sonographic evidence of acute cholecystitis.  2.  CBD upper limits of normal.  3.  Fatty liver.        --- End of Report ---            CATALINO HERMAN MD; Attending Radiologist  This document has been electronically signed. Apr 5 2025  1:04PM (04-05-25 @ 12:59)

## 2025-04-06 NOTE — CONSULT NOTE ADULT - ASSESSMENT
79 year old female with PMH of DM and hypothyroidism, presents with jaundice. Patient reports that after dinner last night she noticed her skin "looking yellow", which subsided before bed. Patient noticed jaundice again after breakfast today. Patient reports intermittent RUQ pain and nausea, denies any emesis. GI consulted for choledocholithiasis.     #jaundice / epigastric pain / elevated liver enzymes and hyperbilirubinemia   Patient with intermittent RUQ pain that has resolved.   T jaret 4.8         04-05-25 @ 20:00  T jaret 7.1         04-05-25 @ 10:51  RUQUS showing Cholelithiasis (prominent stone near the gallbladder neck) without sonographic evidence of acute cholecystitis. CBD upper limits of normal. Fatty liver.   CTAP shows LIVER: Steatosis. BILE DUCTS: Normal caliber. GALLBLADDER: Cholelithiasis  Afebrile, no leukocytosis    Rec  Plan for EUS/ERCP tomorrow  NPO after midnight   Hepatitis panel   trend liver enzymes  Avoid hepatoxic meds   If signs of infections start abx   Will follow

## 2025-04-07 LAB
ALBUMIN SERPL ELPH-MCNC: 3.5 G/DL — SIGNIFICANT CHANGE UP (ref 3.5–5.2)
ALP SERPL-CCNC: 414 U/L — HIGH (ref 30–115)
ALT FLD-CCNC: 210 U/L — HIGH (ref 0–41)
ANION GAP SERPL CALC-SCNC: 18 MMOL/L — HIGH (ref 7–14)
AST SERPL-CCNC: 52 U/L — HIGH (ref 0–41)
BASOPHILS # BLD AUTO: 0.03 K/UL — SIGNIFICANT CHANGE UP (ref 0–0.2)
BASOPHILS NFR BLD AUTO: 0.4 % — SIGNIFICANT CHANGE UP (ref 0–1)
BILIRUB DIRECT SERPL-MCNC: 1.3 MG/DL — HIGH (ref 0–0.3)
BILIRUB INDIRECT FLD-MCNC: 0.4 MG/DL — SIGNIFICANT CHANGE UP (ref 0.2–1.2)
BILIRUB SERPL-MCNC: 1.7 MG/DL — HIGH (ref 0.2–1.2)
BUN SERPL-MCNC: 13 MG/DL — SIGNIFICANT CHANGE UP (ref 10–20)
CALCIUM SERPL-MCNC: 8.6 MG/DL — SIGNIFICANT CHANGE UP (ref 8.4–10.5)
CHLORIDE SERPL-SCNC: 103 MMOL/L — SIGNIFICANT CHANGE UP (ref 98–110)
CO2 SERPL-SCNC: 17 MMOL/L — SIGNIFICANT CHANGE UP (ref 17–32)
CREAT SERPL-MCNC: 0.9 MG/DL — SIGNIFICANT CHANGE UP (ref 0.7–1.5)
EGFR: 66 ML/MIN/1.73M2 — SIGNIFICANT CHANGE UP
EGFR: 66 ML/MIN/1.73M2 — SIGNIFICANT CHANGE UP
EOSINOPHIL # BLD AUTO: 0 K/UL — SIGNIFICANT CHANGE UP (ref 0–0.7)
EOSINOPHIL NFR BLD AUTO: 0 % — SIGNIFICANT CHANGE UP (ref 0–8)
GLUCOSE BLDC GLUCOMTR-MCNC: 118 MG/DL — HIGH (ref 70–99)
GLUCOSE BLDC GLUCOMTR-MCNC: 131 MG/DL — HIGH (ref 70–99)
GLUCOSE BLDC GLUCOMTR-MCNC: 160 MG/DL — HIGH (ref 70–99)
GLUCOSE BLDC GLUCOMTR-MCNC: 188 MG/DL — HIGH (ref 70–99)
GLUCOSE SERPL-MCNC: 170 MG/DL — HIGH (ref 70–99)
HCT VFR BLD CALC: 38.9 % — SIGNIFICANT CHANGE UP (ref 37–47)
HGB BLD-MCNC: 13.4 G/DL — SIGNIFICANT CHANGE UP (ref 12–16)
IMM GRANULOCYTES NFR BLD AUTO: 2.4 % — HIGH (ref 0.1–0.3)
LYMPHOCYTES # BLD AUTO: 0.99 K/UL — LOW (ref 1.2–3.4)
LYMPHOCYTES # BLD AUTO: 11.7 % — LOW (ref 20.5–51.1)
MAGNESIUM SERPL-MCNC: 1.8 MG/DL — SIGNIFICANT CHANGE UP (ref 1.8–2.4)
MCHC RBC-ENTMCNC: 30.2 PG — SIGNIFICANT CHANGE UP (ref 27–31)
MCHC RBC-ENTMCNC: 34.4 G/DL — SIGNIFICANT CHANGE UP (ref 32–37)
MCV RBC AUTO: 87.8 FL — SIGNIFICANT CHANGE UP (ref 81–99)
MONOCYTES # BLD AUTO: 0.58 K/UL — SIGNIFICANT CHANGE UP (ref 0.1–0.6)
MONOCYTES NFR BLD AUTO: 6.9 % — SIGNIFICANT CHANGE UP (ref 1.7–9.3)
NEUTROPHILS # BLD AUTO: 6.64 K/UL — HIGH (ref 1.4–6.5)
NEUTROPHILS NFR BLD AUTO: 78.6 % — HIGH (ref 42.2–75.2)
NRBC BLD AUTO-RTO: 0 /100 WBCS — SIGNIFICANT CHANGE UP (ref 0–0)
PHOSPHATE SERPL-MCNC: 4.6 MG/DL — SIGNIFICANT CHANGE UP (ref 2.1–4.9)
PLATELET # BLD AUTO: 217 K/UL — SIGNIFICANT CHANGE UP (ref 130–400)
PMV BLD: 9.1 FL — SIGNIFICANT CHANGE UP (ref 7.4–10.4)
POTASSIUM SERPL-MCNC: 4.4 MMOL/L — SIGNIFICANT CHANGE UP (ref 3.5–5)
POTASSIUM SERPL-SCNC: 4.4 MMOL/L — SIGNIFICANT CHANGE UP (ref 3.5–5)
PROT SERPL-MCNC: 5.6 G/DL — LOW (ref 6–8)
RBC # BLD: 4.43 M/UL — SIGNIFICANT CHANGE UP (ref 4.2–5.4)
RBC # FLD: 13.5 % — SIGNIFICANT CHANGE UP (ref 11.5–14.5)
SODIUM SERPL-SCNC: 138 MMOL/L — SIGNIFICANT CHANGE UP (ref 135–146)
WBC # BLD: 8.44 K/UL — SIGNIFICANT CHANGE UP (ref 4.8–10.8)
WBC # FLD AUTO: 8.44 K/UL — SIGNIFICANT CHANGE UP (ref 4.8–10.8)

## 2025-04-07 PROCEDURE — 74328 X-RAY BILE DUCT ENDOSCOPY: CPT | Mod: 26

## 2025-04-07 PROCEDURE — 43262 ENDO CHOLANGIOPANCREATOGRAPH: CPT | Mod: XU

## 2025-04-07 PROCEDURE — 43264 ERCP REMOVE DUCT CALCULI: CPT

## 2025-04-07 PROCEDURE — 99232 SBSQ HOSP IP/OBS MODERATE 35: CPT | Mod: 57

## 2025-04-07 RX ORDER — MAGNESIUM SULFATE 500 MG/ML
1 SYRINGE (ML) INJECTION ONCE
Refills: 0 | Status: COMPLETED | OUTPATIENT
Start: 2025-04-07 | End: 2025-04-08

## 2025-04-07 RX ORDER — INDOMETHACIN 50 MG
100 CAPSULE ORAL ONCE
Refills: 0 | Status: COMPLETED | OUTPATIENT
Start: 2025-04-07 | End: 2025-04-07

## 2025-04-07 RX ORDER — SODIUM CHLORIDE 9 G/1000ML
1000 INJECTION, SOLUTION INTRAVENOUS
Refills: 0 | Status: DISCONTINUED | OUTPATIENT
Start: 2025-04-08 | End: 2025-04-09

## 2025-04-07 RX ORDER — MAGNESIUM SULFATE 500 MG/ML
2 SYRINGE (ML) INJECTION ONCE
Refills: 0 | Status: COMPLETED | OUTPATIENT
Start: 2025-04-07 | End: 2025-04-07

## 2025-04-07 RX ADMIN — INSULIN LISPRO 2: 100 INJECTION, SOLUTION INTRAVENOUS; SUBCUTANEOUS at 17:35

## 2025-04-07 RX ADMIN — Medication 650 MILLIGRAM(S): at 18:43

## 2025-04-07 RX ADMIN — INSULIN LISPRO 0: 100 INJECTION, SOLUTION INTRAVENOUS; SUBCUTANEOUS at 08:33

## 2025-04-07 RX ADMIN — Medication 100 MILLIGRAM(S): at 17:33

## 2025-04-07 RX ADMIN — Medication 100 MILLIGRAM(S): at 11:43

## 2025-04-07 RX ADMIN — Medication 100 MILLIGRAM(S): at 11:28

## 2025-04-07 RX ADMIN — CEFTRIAXONE 100 MILLIGRAM(S): 500 INJECTION, POWDER, FOR SOLUTION INTRAMUSCULAR; INTRAVENOUS at 17:33

## 2025-04-07 RX ADMIN — Medication 50 MICROGRAM(S): at 05:26

## 2025-04-07 RX ADMIN — ENOXAPARIN SODIUM 40 MILLIGRAM(S): 100 INJECTION SUBCUTANEOUS at 17:35

## 2025-04-07 RX ADMIN — Medication 25 GRAM(S): at 01:05

## 2025-04-07 RX ADMIN — Medication 100 MILLIGRAM(S): at 05:27

## 2025-04-07 NOTE — PROGRESS NOTE ADULT - ASSESSMENT
ASSESSMENT:  79yF w/ PMHx of  DM and hypothyroidism  who presented with jaundice associated with RUQ pain and nausea.     PLAN:  - NPO + IVF  - GI: EUS/ERCP today, will f/u  - Monitor for fever  - Schedule OR during this admission  - C/w abx  - Pain control: tylenol/toradol   - Insulin sliding scale     EGS [id number]

## 2025-04-08 LAB
BASOPHILS # BLD AUTO: 0.05 K/UL — SIGNIFICANT CHANGE UP (ref 0–0.2)
BASOPHILS NFR BLD AUTO: 0.6 % — SIGNIFICANT CHANGE UP (ref 0–1)
EOSINOPHIL # BLD AUTO: 0.17 K/UL — SIGNIFICANT CHANGE UP (ref 0–0.7)
EOSINOPHIL NFR BLD AUTO: 2 % — SIGNIFICANT CHANGE UP (ref 0–8)
GLUCOSE BLDC GLUCOMTR-MCNC: 109 MG/DL — HIGH (ref 70–99)
GLUCOSE BLDC GLUCOMTR-MCNC: 120 MG/DL — HIGH (ref 70–99)
GLUCOSE BLDC GLUCOMTR-MCNC: 136 MG/DL — HIGH (ref 70–99)
GLUCOSE BLDC GLUCOMTR-MCNC: 137 MG/DL — HIGH (ref 70–99)
HAV IGM SER-ACNC: SIGNIFICANT CHANGE UP
HBV CORE IGM SER-ACNC: SIGNIFICANT CHANGE UP
HBV SURFACE AG SER-ACNC: SIGNIFICANT CHANGE UP
HCT VFR BLD CALC: 36.2 % — LOW (ref 37–47)
HCV AB S/CO SERPL IA: 0.1 S/CO — SIGNIFICANT CHANGE UP (ref 0–0.79)
HCV AB SERPL-IMP: SIGNIFICANT CHANGE UP
HGB BLD-MCNC: 12.6 G/DL — SIGNIFICANT CHANGE UP (ref 12–16)
IMM GRANULOCYTES NFR BLD AUTO: 1.5 % — HIGH (ref 0.1–0.3)
LYMPHOCYTES # BLD AUTO: 2.72 K/UL — SIGNIFICANT CHANGE UP (ref 1.2–3.4)
LYMPHOCYTES # BLD AUTO: 31.6 % — SIGNIFICANT CHANGE UP (ref 20.5–51.1)
MCHC RBC-ENTMCNC: 30.7 PG — SIGNIFICANT CHANGE UP (ref 27–31)
MCHC RBC-ENTMCNC: 34.8 G/DL — SIGNIFICANT CHANGE UP (ref 32–37)
MCV RBC AUTO: 88.1 FL — SIGNIFICANT CHANGE UP (ref 81–99)
MONOCYTES # BLD AUTO: 0.75 K/UL — HIGH (ref 0.1–0.6)
MONOCYTES NFR BLD AUTO: 8.7 % — SIGNIFICANT CHANGE UP (ref 1.7–9.3)
NEUTROPHILS # BLD AUTO: 4.79 K/UL — SIGNIFICANT CHANGE UP (ref 1.4–6.5)
NEUTROPHILS NFR BLD AUTO: 55.6 % — SIGNIFICANT CHANGE UP (ref 42.2–75.2)
NRBC BLD AUTO-RTO: 0 /100 WBCS — SIGNIFICANT CHANGE UP (ref 0–0)
PLATELET # BLD AUTO: 218 K/UL — SIGNIFICANT CHANGE UP (ref 130–400)
PMV BLD: 8.8 FL — SIGNIFICANT CHANGE UP (ref 7.4–10.4)
RBC # BLD: 4.11 M/UL — LOW (ref 4.2–5.4)
RBC # FLD: 13.6 % — SIGNIFICANT CHANGE UP (ref 11.5–14.5)
WBC # BLD: 8.61 K/UL — SIGNIFICANT CHANGE UP (ref 4.8–10.8)
WBC # FLD AUTO: 8.61 K/UL — SIGNIFICANT CHANGE UP (ref 4.8–10.8)

## 2025-04-08 PROCEDURE — 99232 SBSQ HOSP IP/OBS MODERATE 35: CPT | Mod: 57,25

## 2025-04-08 PROCEDURE — 99222 1ST HOSP IP/OBS MODERATE 55: CPT

## 2025-04-08 PROCEDURE — 99233 SBSQ HOSP IP/OBS HIGH 50: CPT

## 2025-04-08 RX ADMIN — Medication 650 MILLIGRAM(S): at 00:05

## 2025-04-08 RX ADMIN — SODIUM CHLORIDE 100 MILLILITER(S): 9 INJECTION, SOLUTION INTRAVENOUS at 00:06

## 2025-04-08 RX ADMIN — SODIUM CHLORIDE 100 MILLILITER(S): 9 INJECTION, SOLUTION INTRAVENOUS at 09:22

## 2025-04-08 RX ADMIN — Medication 650 MILLIGRAM(S): at 11:17

## 2025-04-08 RX ADMIN — Medication 1 APPLICATION(S): at 05:26

## 2025-04-08 RX ADMIN — INSULIN LISPRO 0: 100 INJECTION, SOLUTION INTRAVENOUS; SUBCUTANEOUS at 09:00

## 2025-04-08 RX ADMIN — INSULIN LISPRO 0: 100 INJECTION, SOLUTION INTRAVENOUS; SUBCUTANEOUS at 12:25

## 2025-04-08 RX ADMIN — KETOROLAC TROMETHAMINE 15 MILLIGRAM(S): 30 INJECTION, SOLUTION INTRAMUSCULAR; INTRAVENOUS at 02:11

## 2025-04-08 RX ADMIN — Medication 50 MICROGRAM(S): at 05:26

## 2025-04-08 RX ADMIN — KETOROLAC TROMETHAMINE 15 MILLIGRAM(S): 30 INJECTION, SOLUTION INTRAMUSCULAR; INTRAVENOUS at 20:44

## 2025-04-08 RX ADMIN — Medication 100 MILLIGRAM(S): at 05:25

## 2025-04-08 RX ADMIN — Medication 100 GRAM(S): at 00:21

## 2025-04-08 RX ADMIN — Medication 650 MILLIGRAM(S): at 00:35

## 2025-04-08 RX ADMIN — SODIUM CHLORIDE 100 MILLILITER(S): 9 INJECTION, SOLUTION INTRAVENOUS at 23:29

## 2025-04-08 NOTE — DIETITIAN INITIAL EVALUATION ADULT - PERTINENT LABORATORY DATA
Covering Dr Armas  
04-07    138  |  103  |  13  ----------------------------<  170[H]  4.4   |  17  |  0.9    Ca    8.6      07 Apr 2025 22:15  Phos  4.6     04-07  Mg     1.8     04-07    TPro  5.6[L]  /  Alb  3.5  /  TBili  1.7[H]  /  DBili  1.3[H]  /  AST  52[H]  /  ALT  210[H]  /  AlkPhos  414[H]  04-07  POCT Blood Glucose.: 136 mg/dL (04-08-25 @ 11:38)

## 2025-04-08 NOTE — DIETITIAN INITIAL EVALUATION ADULT - PERTINENT MEDS FT
MEDICATIONS  (STANDING):  acetaminophen     Tablet .. 650 milliGRAM(s) Oral every 6 hours  cefTRIAXone   IVPB 1000 milliGRAM(s) IV Intermittent every 24 hours  chlorhexidine 2% Cloths 1 Application(s) Topical <User Schedule>  dextrose 50% Injectable 25 Gram(s) IV Push once  enoxaparin Injectable 40 milliGRAM(s) SubCutaneous every 24 hours  glucagon  Injectable 1 milliGRAM(s) IntraMuscular once  insulin lispro (ADMELOG) corrective regimen sliding scale   SubCutaneous three times a day before meals  lactated ringers. 1000 milliLiter(s) (100 mL/Hr) IV Continuous <Continuous>  levothyroxine 50 MICROGram(s) Oral daily  metroNIDAZOLE  IVPB 500 milliGRAM(s) IV Intermittent every 12 hours  metroNIDAZOLE  IVPB        MEDICATIONS  (PRN):  ketorolac   Injectable 15 milliGRAM(s) IV Push every 6 hours PRN Moderate Pain (4 - 6)

## 2025-04-08 NOTE — DIETITIAN INITIAL EVALUATION ADULT - NS FNS DIET ORDER
Diet, NPO after Midnight:      NPO Start Date: 07-Apr-2025,   NPO Start Time: 23:59 (04-07-25 @ 15:19)  Diet, Clear Liquid (04-07-25 @ 15:19)

## 2025-04-08 NOTE — DIETITIAN INITIAL EVALUATION ADULT - NUTRITIONGOAL OUTCOME1
Patient to meet 75% or more of estimated nutrient needs via PO intake without GI dysfunction within 3-5 days

## 2025-04-08 NOTE — DIETITIAN INITIAL EVALUATION ADULT - OTHER INFO
Patient is a 79 year old female, with PMHx of DM and hypothyroidism. Admitted for Jaundice. Patient is scheduled for cholecystectomy today.     Patient noted with elevated LFTs, A1C of 5.6.

## 2025-04-08 NOTE — PROGRESS NOTE ADULT - ASSESSMENT
Patient is a pleasant 79-year-old female with a past medical history of diabetes, hypothyroid, who presented initially with right upper quadrant pain and jaundice.  Patient had an ERCP done on March 7 consisting of sphincterotomy and removal of large common bile duct stone.  Patient did not have stent placement.  Patient did well overnight and noted a lot of her right upper quadrant discomfort has improved significantly.  Patient denies melena, fever, chills, or episodes of vomiting.  Patient is scheduled for OR today for cholecystectomy.  Patient has no additional questions.    CBD Stone/ Cholelithiasis   - T jaret much better  - Exam and Hemodynamics reassuring  - No stent placed so does not need additional Endoscopy after  - Awaiting OR today for CCY   - Re-Call [facility] as needed

## 2025-04-08 NOTE — PROGRESS NOTE ADULT - ASSESSMENT
ASSESSMENT:  79y F here with choledocholithiasis, s/p ercp    PLAN:  -OR today  -Keep NPO, IVF    Lines/Tubes: PIV    [id number]

## 2025-04-08 NOTE — DIETITIAN INITIAL EVALUATION ADULT - ADD RECOMMEND
Interventions:  -Once cleared for PO intake, change diet order to Low Fat, Consistent Carbohydrates    Monitor:  -PO intake  -Diet/texture tolerance  -Weight  -Nutrition related labs  -Nutrition focused physical findings    High Nutrition Risk Follow Up

## 2025-04-08 NOTE — DIETITIAN INITIAL EVALUATION ADULT - ORAL INTAKE PTA/DIET HISTORY
Patient reports adequate appetite at home usually eating 3 meals/day. Denies difficulty chewing/swallowing. NKFA or Anglican/cultural food restrictions.     Reports UBW is 71.8kg, with intentional weight loss within the past few months. Current weight documented is 68.9kg. No height documented for current admission; previous visit data documents height of 152.4cm.     Patient was NPO at time of RD visit; will attempt to observe PO intake of meals in hospital at f/u. Denies symptoms of N/V/D/Constipation. Last BM was on 4/3/25 per patient.

## 2025-04-08 NOTE — CONSULT NOTE ADULT - SUBJECTIVE AND OBJECTIVE BOX
This is a 79 year old female patient, with hx of DM and hypothyroidism, presenting to our hospital for Jaundice.       1. Jaundice associated with intermittent abdominal pain associated with obstructive pattern seems due to Choledocholithiasis   - Admit to Surgery       -Hepatitis panel:   - Started on Zosyn   - US abdomen:  a) Cholelithiasis (prominent stone near the gallbladder neck) without sonographic evidence of acute cholecystitis.  b) CBD upper limits of normal.  - CT abdomen pelvis;  a) Cholelithiasis   - GI consult:  a) Plan for EUS/ERCP    2. DM-II   - insulin protocol       3. DVT px  This is a 79 year old female patient, with hx of DM and hypothyroidism, presenting to our hospital for Jaundice.         VITALS:   T(C): 37 (04-08-25 @ 08:28), Max: 37 (04-08-25 @ 08:28)  HR: 71 (04-08-25 @ 08:28) (71 - 81)  BP: 138/76 (04-08-25 @ 08:28) (132/66 - 138/76)  RR: 18 (04-08-25 @ 08:28) (18 - 18)  SpO2: 96% (04-08-25 @ 08:28) (95% - 100%)    GENERAL; Looks comfortable   HEART: Regular rate and rhythm  LUNGS: GBAE   ABDOMEN: Soft, nontender, nondistended, +BS  EXTREMITIES: 2+ peripheral pulses bilaterally.        1. Jaundice associated with intermittent abdominal pain associated with obstructive pattern seems due to Choledocholithiasis   - Admit to Surgery       -Hepatitis panel: pending   - Started on Zosyn   - NPO for cholecystectomy   - US abdomen:  a) Cholelithiasis (prominent stone near the gallbladder neck) without sonographic evidence of acute cholecystitis.  b) CBD upper limits of normal.  - CT abdomen pelvis;  a) Cholelithiasis   - GI consult:  a) Patient had an ERCP done on March 7 consisting of sphincterotomy and removal of large common bile duct stone.    2. DM-II   - insulin protocol       3. DVT px

## 2025-04-08 NOTE — DIETITIAN INITIAL EVALUATION ADULT - OTHER CALCULATIONS
Energy: 1320-1430kcal/day (using MSJ x 1.2-1.3) with consideration for age, weight status, recent surgery (scheduled for surgery today)  Protein: 76-90g/day (using 1.1-1.3g/kg) with considerations for same reasons as above  Fluid: 1mL/kcal/day

## 2025-04-09 ENCOUNTER — RESULT REVIEW (OUTPATIENT)
Age: 80
End: 2025-04-09

## 2025-04-09 LAB
ALBUMIN SERPL ELPH-MCNC: 3.6 G/DL — SIGNIFICANT CHANGE UP (ref 3.5–5.2)
ALP SERPL-CCNC: 392 U/L — HIGH (ref 30–115)
ALT FLD-CCNC: 166 U/L — HIGH (ref 0–41)
ANION GAP SERPL CALC-SCNC: 13 MMOL/L — SIGNIFICANT CHANGE UP (ref 7–14)
ANION GAP SERPL CALC-SCNC: 23 MMOL/L — HIGH (ref 7–14)
APTT BLD: 30.3 SEC — SIGNIFICANT CHANGE UP (ref 27–39.2)
AST SERPL-CCNC: 74 U/L — HIGH (ref 0–41)
BILIRUB DIRECT SERPL-MCNC: 0.9 MG/DL — HIGH (ref 0–0.3)
BILIRUB INDIRECT FLD-MCNC: 0.4 MG/DL — SIGNIFICANT CHANGE UP (ref 0.2–1.2)
BILIRUB SERPL-MCNC: 1.3 MG/DL — HIGH (ref 0.2–1.2)
BUN SERPL-MCNC: 11 MG/DL — SIGNIFICANT CHANGE UP (ref 10–20)
BUN SERPL-MCNC: 14 MG/DL — SIGNIFICANT CHANGE UP (ref 10–20)
CALCIUM SERPL-MCNC: 8.4 MG/DL — SIGNIFICANT CHANGE UP (ref 8.4–10.5)
CALCIUM SERPL-MCNC: 8.8 MG/DL — SIGNIFICANT CHANGE UP (ref 8.4–10.5)
CHLORIDE SERPL-SCNC: 101 MMOL/L — SIGNIFICANT CHANGE UP (ref 98–110)
CHLORIDE SERPL-SCNC: 105 MMOL/L — SIGNIFICANT CHANGE UP (ref 98–110)
CO2 SERPL-SCNC: 17 MMOL/L — SIGNIFICANT CHANGE UP (ref 17–32)
CO2 SERPL-SCNC: 23 MMOL/L — SIGNIFICANT CHANGE UP (ref 17–32)
CREAT SERPL-MCNC: 0.8 MG/DL — SIGNIFICANT CHANGE UP (ref 0.7–1.5)
CREAT SERPL-MCNC: 0.8 MG/DL — SIGNIFICANT CHANGE UP (ref 0.7–1.5)
EGFR: 76 ML/MIN/1.73M2 — SIGNIFICANT CHANGE UP
GLUCOSE BLDC GLUCOMTR-MCNC: 117 MG/DL — HIGH (ref 70–99)
GLUCOSE BLDC GLUCOMTR-MCNC: 161 MG/DL — HIGH (ref 70–99)
GLUCOSE BLDC GLUCOMTR-MCNC: 207 MG/DL — HIGH (ref 70–99)
GLUCOSE SERPL-MCNC: 215 MG/DL — HIGH (ref 70–99)
GLUCOSE SERPL-MCNC: 90 MG/DL — SIGNIFICANT CHANGE UP (ref 70–99)
INR BLD: 0.92 RATIO — SIGNIFICANT CHANGE UP (ref 0.65–1.3)
MAGNESIUM SERPL-MCNC: 1.8 MG/DL — SIGNIFICANT CHANGE UP (ref 1.8–2.4)
MAGNESIUM SERPL-MCNC: 1.9 MG/DL — SIGNIFICANT CHANGE UP (ref 1.8–2.4)
PHOSPHATE SERPL-MCNC: 3.5 MG/DL — SIGNIFICANT CHANGE UP (ref 2.1–4.9)
PHOSPHATE SERPL-MCNC: 5.2 MG/DL — HIGH (ref 2.1–4.9)
POTASSIUM SERPL-MCNC: 4.3 MMOL/L — SIGNIFICANT CHANGE UP (ref 3.5–5)
POTASSIUM SERPL-MCNC: 4.3 MMOL/L — SIGNIFICANT CHANGE UP (ref 3.5–5)
POTASSIUM SERPL-SCNC: 4.3 MMOL/L — SIGNIFICANT CHANGE UP (ref 3.5–5)
POTASSIUM SERPL-SCNC: 4.3 MMOL/L — SIGNIFICANT CHANGE UP (ref 3.5–5)
PROT SERPL-MCNC: 5.6 G/DL — LOW (ref 6–8)
PROTHROM AB SERPL-ACNC: 10.8 SEC — SIGNIFICANT CHANGE UP (ref 9.95–12.87)
SODIUM SERPL-SCNC: 141 MMOL/L — SIGNIFICANT CHANGE UP (ref 135–146)
SODIUM SERPL-SCNC: 141 MMOL/L — SIGNIFICANT CHANGE UP (ref 135–146)

## 2025-04-09 PROCEDURE — 47562 LAPAROSCOPIC CHOLECYSTECTOMY: CPT

## 2025-04-09 PROCEDURE — 99232 SBSQ HOSP IP/OBS MODERATE 35: CPT | Mod: 24,25

## 2025-04-09 PROCEDURE — 99232 SBSQ HOSP IP/OBS MODERATE 35: CPT

## 2025-04-09 PROCEDURE — S2900 ROBOTIC SURGICAL SYSTEM: CPT | Mod: NC

## 2025-04-09 PROCEDURE — 88304 TISSUE EXAM BY PATHOLOGIST: CPT | Mod: 26

## 2025-04-09 RX ORDER — HYDROMORPHONE/SOD CHLOR,ISO/PF 2 MG/10 ML
1 SYRINGE (ML) INJECTION
Refills: 0 | Status: DISCONTINUED | OUTPATIENT
Start: 2025-04-09 | End: 2025-04-09

## 2025-04-09 RX ORDER — DEXTROSE 50 % IN WATER 50 %
25 SYRINGE (ML) INTRAVENOUS ONCE
Refills: 0 | Status: DISCONTINUED | OUTPATIENT
Start: 2025-04-09 | End: 2025-04-10

## 2025-04-09 RX ORDER — ACETAMINOPHEN 500 MG/5ML
1000 LIQUID (ML) ORAL ONCE
Refills: 0 | Status: DISCONTINUED | OUTPATIENT
Start: 2025-04-09 | End: 2025-04-09

## 2025-04-09 RX ORDER — HYDROMORPHONE/SOD CHLOR,ISO/PF 2 MG/10 ML
0.5 SYRINGE (ML) INJECTION
Refills: 0 | Status: DISCONTINUED | OUTPATIENT
Start: 2025-04-09 | End: 2025-04-09

## 2025-04-09 RX ORDER — ONDANSETRON HCL/PF 4 MG/2 ML
4 VIAL (ML) INJECTION ONCE
Refills: 0 | Status: DISCONTINUED | OUTPATIENT
Start: 2025-04-09 | End: 2025-04-09

## 2025-04-09 RX ORDER — INSULIN LISPRO 100 U/ML
INJECTION, SOLUTION INTRAVENOUS; SUBCUTANEOUS
Refills: 0 | Status: DISCONTINUED | OUTPATIENT
Start: 2025-04-09 | End: 2025-04-10

## 2025-04-09 RX ORDER — KETOROLAC TROMETHAMINE 30 MG/ML
15 INJECTION, SOLUTION INTRAMUSCULAR; INTRAVENOUS EVERY 6 HOURS
Refills: 0 | Status: DISCONTINUED | OUTPATIENT
Start: 2025-04-09 | End: 2025-04-10

## 2025-04-09 RX ORDER — MAGNESIUM SULFATE 500 MG/ML
2 SYRINGE (ML) INJECTION ONCE
Refills: 0 | Status: COMPLETED | OUTPATIENT
Start: 2025-04-09 | End: 2025-04-09

## 2025-04-09 RX ORDER — MAGNESIUM SULFATE 500 MG/ML
1 SYRINGE (ML) INJECTION ONCE
Refills: 0 | Status: COMPLETED | OUTPATIENT
Start: 2025-04-09 | End: 2025-04-09

## 2025-04-09 RX ORDER — SODIUM CHLORIDE 9 G/1000ML
1000 INJECTION, SOLUTION INTRAVENOUS
Refills: 0 | Status: DISCONTINUED | OUTPATIENT
Start: 2025-04-09 | End: 2025-04-09

## 2025-04-09 RX ORDER — ACETAMINOPHEN 500 MG/5ML
650 LIQUID (ML) ORAL EVERY 6 HOURS
Refills: 0 | Status: DISCONTINUED | OUTPATIENT
Start: 2025-04-09 | End: 2025-04-10

## 2025-04-09 RX ORDER — LEVOTHYROXINE SODIUM 300 MCG
50 TABLET ORAL DAILY
Refills: 0 | Status: DISCONTINUED | OUTPATIENT
Start: 2025-04-09 | End: 2025-04-10

## 2025-04-09 RX ORDER — GLUCAGON 3 MG/1
1 POWDER NASAL ONCE
Refills: 0 | Status: DISCONTINUED | OUTPATIENT
Start: 2025-04-09 | End: 2025-04-10

## 2025-04-09 RX ORDER — ENOXAPARIN SODIUM 100 MG/ML
40 INJECTION SUBCUTANEOUS EVERY 24 HOURS
Refills: 0 | Status: DISCONTINUED | OUTPATIENT
Start: 2025-04-09 | End: 2025-04-10

## 2025-04-09 RX ADMIN — Medication 25 GRAM(S): at 05:07

## 2025-04-09 RX ADMIN — Medication 0.5 MILLIGRAM(S): at 14:20

## 2025-04-09 RX ADMIN — Medication 650 MILLIGRAM(S): at 17:06

## 2025-04-09 RX ADMIN — Medication 100 GRAM(S): at 23:51

## 2025-04-09 RX ADMIN — Medication 1 APPLICATION(S): at 05:08

## 2025-04-09 RX ADMIN — Medication 650 MILLIGRAM(S): at 05:06

## 2025-04-09 RX ADMIN — Medication 50 MICROGRAM(S): at 05:07

## 2025-04-09 RX ADMIN — ENOXAPARIN SODIUM 40 MILLIGRAM(S): 100 INJECTION SUBCUTANEOUS at 17:05

## 2025-04-09 RX ADMIN — Medication 650 MILLIGRAM(S): at 05:36

## 2025-04-09 RX ADMIN — INSULIN LISPRO 2: 100 INJECTION, SOLUTION INTRAVENOUS; SUBCUTANEOUS at 17:05

## 2025-04-09 RX ADMIN — Medication 650 MILLIGRAM(S): at 23:36

## 2025-04-09 RX ADMIN — Medication 650 MILLIGRAM(S): at 17:36

## 2025-04-09 NOTE — PROGRESS NOTE ADULT - ASSESSMENT
79y F here with choledocholithiasis, s/p ercp with stone removal and sphicterectomy. Medicine consulted for comanagement.       #Jaundice w/ intermittent abdominal pain associated with obstructive pattern seems due to Choledocholithiasis  #Transaminitis  #Direct hyperbilirubinemia   - US abdomen:  a) Cholelithiasis (prominent stone near the gallbladder neck) without sonographic evidence of acute cholecystitis.  b) CBD upper limits of normal.  - CT abdomen pelvis;  a) Cholelithiasis   - GI consult:  a) Patient had an ERCP done on March 7 consisting of sphincterotomy and removal of large common bile duct stone.  - Management per Surgery   All imaging, labs, EKG and vitals personally reviewed.  - NPO w/ IVF, preop labs, pending OR   - Postop PT/OT/AAT/Abdominal binder  - PRN bowel reg and pain scale     #T2DM  - FG TIDAC/HS  - ISS for glucose >200 (target 110-180 while inpatient)  - Hypoglycemic protocol PRN  - Calculate 24H insulin requirement daily, if indicated       #DVT px, CHG      Total time spent to complete patient's bedside assessment, reviewed medical chart, discussed medical plan of care with team was more than 35 minutes with >50% of time spent face to face with patient, discussion with patient/family and/or coordination of care.      *NOTE*   This is an incomplete consult note. All final recommendations to follow after interview and examination of the patient.     If any questions, please send a message or call on [employer] Teams. 79y F here with choledocholithiasis, s/p ercp with stone removal and sphicterectomy. Medicine consulted for comanagement.       #Jaundice w/ intermittent abdominal pain associated with obstructive pattern seems due to Choledocholithiasis  #Transaminitis  #Direct hyperbilirubinemia   - US abdomen:  a) Cholelithiasis (prominent stone near the gallbladder neck) without sonographic evidence of acute cholecystitis.  b) CBD upper limits of normal.  - CT abdomen pelvis;  a) Cholelithiasis   - GI consult:  a) Patient had an ERCP done on March 7 consisting of sphincterotomy and removal of large common bile duct stone.  - Management per Surgery   All imaging, labs, EKG and vitals personally reviewed.  - NPO w/ IVF, preop labs, pending OR   - Postop PT/OT/AAT/Abdominal binder  - PRN bowel reg and pain scale     #T2DM  - FG TIDAC/HS  - ISS for glucose >200 (target 110-180 while inpatient)  - Hypoglycemic protocol PRN  - Calculate 24H insulin requirement daily, if indicated       #DVT px, CHG      Total time spent to complete patient's bedside assessment, reviewed medical chart, discussed medical plan of care with team was more than 35 minutes with >50% of time spent face to face with patient, discussion with patient/family and/or coordination of care.      If any questions, please send a message or call on [employer].

## 2025-04-09 NOTE — PROGRESS NOTE ADULT - ASSESSMENT
79y F here with choledocholithiasis, s/p ercp with stone removal and sphicterectomy, s/p cholecystectomy    PLAN:  - NPO with IVF  - F/u OR  - AM Coags  - Monitor LFTs  - Monitor abdominal pain  - Encourage ambulation    Lines/Tubes: PIV    1949

## 2025-04-09 NOTE — PROGRESS NOTE ADULT - SUBJECTIVE AND OBJECTIVE BOX
SURGERY PROGRESS NOTE    Patient: HENRI ROBERTS , 79y ([birth year])Female   MRN: [id number]  Location: [address]  Visit: 04-05-25 Inpatient  Date: 04-09-25 @ 01:53    Hospital Day 5  POD 1    Dx/ procedures: CHOLEDOCHOLITHIASIS   S/P ERCP, STONE REMOVAL, SPHINCTEROTOMY, s/P CHOLECYSTECTOMY    24h events: No OR availability yesterday. Over night with stable abdominal. No acute events.    PHYSICAL EXAM:  GENERAL: NAD, well-appearing  CHEST/LUNG: Equal chest rise bilaterally  HEART: Regular rate and rhythm  ABDOMEN: Soft, Nontender, Nondistended.  EXTREMITIES:  No clubbing, cyanosis, or edema    PAST MEDICAL & SURGICAL HISTORY:  Diabetes  niddm      Hypothyroid      H/O colonoscopy  2022      Previous back surgery      S/P surgical removal of pilonidal cyst      H/O shoulder surgery  right          Vitals:   T(F): 98.2 (04-09-25 @ 00:26), Max: 98.6 (04-08-25 @ 08:28)  HR: 67 (04-09-25 @ 01:28)  BP: 146/76 (04-09-25 @ 01:28)  RR: 18 (04-09-25 @ 01:28)  SpO2: 97% (04-09-25 @ 01:28)      Diet, NPO after Midnight:      NPO Start Date: 08-Apr-2025,   NPO Start Time: 23:59  Diet, Clear Liquid  Diet, NPO after Midnight:      NPO Start Date: 07-Apr-2025,   NPO Start Time: 23:59      Fluids:     I & O's:    04-07-25 @ 07:01  -  04-08-25 @ 07:00  --------------------------------------------------------  IN:    Lactated Ringers: 700 mL  Total IN: 700 mL    OUT:  Total OUT: 0 mL    Total NET: 700 mL        MEDICATIONS  (STANDING):  acetaminophen     Tablet .. 650 milliGRAM(s) Oral every 6 hours  chlorhexidine 2% Cloths 1 Application(s) Topical <User Schedule>  dextrose 50% Injectable 25 Gram(s) IV Push once  enoxaparin Injectable 40 milliGRAM(s) SubCutaneous every 24 hours  glucagon  Injectable 1 milliGRAM(s) IntraMuscular once  insulin lispro (ADMELOG) corrective regimen sliding scale   SubCutaneous three times a day before meals  lactated ringers. 1000 milliLiter(s) (100 mL/Hr) IV Continuous <Continuous>  levothyroxine 50 MICROGram(s) Oral daily  magnesium sulfate  IVPB 2 Gram(s) IV Intermittent once    MEDICATIONS  (PRN):  ketorolac   Injectable 15 milliGRAM(s) IV Push every 6 hours PRN Moderate Pain (4 - 6)      DVT PROPHYLAXIS: enoxaparin Injectable 40 milliGRAM(s) SubCutaneous every 24 hours    GI PROPHYLAXIS:   ANTICOAGULATION:   ANTIBIOTICS:            LAB/STUDIES:  Labs:  CAPILLARY BLOOD GLUCOSE      POCT Blood Glucose.: 109 mg/dL (08 Apr 2025 20:57)  POCT Blood Glucose.: 137 mg/dL (08 Apr 2025 15:46)  POCT Blood Glucose.: 136 mg/dL (08 Apr 2025 11:38)  POCT Blood Glucose.: 120 mg/dL (08 Apr 2025 08:05)                          12.6   8.61  )-----------( 218      ( 08 Apr 2025 23:28 )             36.2       Auto Immature Granulocyte %: 1.5 % (04-08-25 @ 23:28)    04-08    141  |  105  |  11  ----------------------------<  90  4.3   |  23  |  0.8      Calcium: 8.8 mg/dL (04-08-25 @ 23:28)      LFTs:             5.6  | 1.3  | 74       ------------------[392     ( 08 Apr 2025 23:28 )  3.6  | 0.9  | 166         Lipase:x      Amylase:x             Coags:            Urinalysis Basic - ( 08 Apr 2025 23:28 )    Color: x / Appearance: x / SG: x / pH: x  Gluc: 90 mg/dL / Ketone: x  / Bili: x / Urobili: x   Blood: x / Protein: x / Nitrite: x   Leuk Esterase: x / RBC: x / WBC x   Sq Epi: x / Non Sq Epi: x / Bacteria: x
GENERAL SURGERY PROGRESS NOTE    Patient: HENRI ROBERTS , 79y ([birth year])Female   MRN: [id number]  Location: [address]  Visit: 04-05-25 Inpatient  Date: 04-08-25 @ 09:38    Hospital Day #: 4  Post-Op Day #:    Procedure/Dx/Injuries: Choldecholithiasis    Events of past 24 hours: The patient underwent ERCP, sphincterotomy performed, large stone removed. The patient is planned for the OR today.     PAST MEDICAL & SURGICAL HISTORY:  Diabetes  niddm  Hypothyroid  H/O colonoscopy  2022  Previous back surgery  S/P surgical removal of pilonidal cyst  H/O shoulder surgery  right      Vitals:   T(F): 98.6 (04-08-25 @ 08:28), Max: 98.7 (04-07-25 @ 11:55)  HR: 71 (04-08-25 @ 08:28)  BP: 138/76 (04-08-25 @ 08:28)  RR: 18 (04-08-25 @ 08:28)  SpO2: 96% (04-08-25 @ 08:28)      Diet, NPO after Midnight:      NPO Start Date: 07-Apr-2025,   NPO Start Time: 23:59  Diet, Clear Liquid      Fluids: lactated ringers.: Solution, 1000 milliLiter(s) infuse at 100 mL/Hr  Special Instructions: start at midnight  Provider's Contact #: [phone]      I & O's:    04-07-25 @ 07:01  -  04-08-25 @ 07:00  --------------------------------------------------------  IN:    Lactated Ringers: 700 mL  Total IN: 700 mL    OUT:  Total OUT: 0 mL  Total NET: 700 mL    PHYSICAL EXAM:  GENERAL: NAD, well-appearing  CHEST/LUNG: Equal chest rise bilaterally  HEART: Regular rate and rhythm  ABDOMEN: Soft, Nontender, Nondistended;   EXTREMITIES:  No clubbing, cyanosis, or edema      MEDICATIONS  (STANDING):  acetaminophen     Tablet .. 650 milliGRAM(s) Oral every 6 hours  cefTRIAXone   IVPB 1000 milliGRAM(s) IV Intermittent every 24 hours  chlorhexidine 2% Cloths 1 Application(s) Topical <User Schedule>  dextrose 50% Injectable 25 Gram(s) IV Push once  enoxaparin Injectable 40 milliGRAM(s) SubCutaneous every 24 hours  glucagon  Injectable 1 milliGRAM(s) IntraMuscular once  insulin lispro (ADMELOG) corrective regimen sliding scale   SubCutaneous three times a day before meals  lactated ringers. 1000 milliLiter(s) (100 mL/Hr) IV Continuous <Continuous>  levothyroxine 50 MICROGram(s) Oral daily  metroNIDAZOLE  IVPB 500 milliGRAM(s) IV Intermittent every 12 hours  metroNIDAZOLE  IVPB        MEDICATIONS  (PRN):  ketorolac   Injectable 15 milliGRAM(s) IV Push every 6 hours PRN Moderate Pain (4 - 6)      DVT PROPHYLAXIS: enoxaparin Injectable 40 milliGRAM(s) SubCutaneous every 24 hours    GI PROPHYLAXIS:   ANTICOAGULATION:   ANTIBIOTICS:  cefTRIAXone   IVPB 1000 milliGRAM(s)  metroNIDAZOLE  IVPB 500 milliGRAM(s)  metroNIDAZOLE  IVPB      LAB/STUDIES:  Labs:  CAPILLARY BLOOD GLUCOSE      POCT Blood Glucose.: 120 mg/dL (08 Apr 2025 08:05)  POCT Blood Glucose.: 188 mg/dL (07 Apr 2025 20:51)  POCT Blood Glucose.: 160 mg/dL (07 Apr 2025 16:54)  POCT Blood Glucose.: 118 mg/dL (07 Apr 2025 10:35)                          13.4   8.44  )-----------( 217      ( 07 Apr 2025 22:15 )             38.9       Auto Immature Granulocyte %: 2.4 % (04-07-25 @ 22:15)    04-07    138  |  103  |  13  ----------------------------<  170[H]  4.4   |  17  |  0.9      Calcium: 8.6 mg/dL (04-07-25 @ 22:15)      LFTs:             5.6  | 1.7  | 52       ------------------[414     ( 07 Apr 2025 22:15 )  3.5  | 1.3  | 210            Blood Gas Venous - Lactate: 1.6 mmol/L (04-05-25 @ 10:53)    Urinalysis Basic - ( 07 Apr 2025 22:15 )    Color: x / Appearance: x / SG: x / pH: x  Gluc: 170 mg/dL / Ketone: x  / Bili: x / Urobili: x   Blood: x / Protein: x / Nitrite: x   Leuk Esterase: x / RBC: x / WBC x   Sq Epi: x / Non Sq Epi: x / Bacteria: x        Urinalysis with Rflx Culture (collected 05 Apr 2025 13:30)    Culture - Urine (collected 05 Apr 2025 13:30)  Source: Clean Catch None  Final Report (07 Apr 2025 16:23):    10,000 - 49,000 CFU/mL Streptococcus agalactiae (Group B) Group B    streptococci are susceptible to ampicillin,    penicillin and cefazolin, but may be resistant to    erythromycin and clindamycin.    <10,000 CFU/ml Normal Urogenital avelina present    IMAGING:  n/a    ACCESS/ DEVICES:  [x ] Peripheral IV      
SURGERY PROGRESS NOTE    Patient: HENRI ROBERTS , 79y ([birth year])Female   MRN: [id number]  Location: [address]  Visit: 04-05-25 Inpatient  Date: 04-07-25 @ 00:45    Hospital Day #: 3     Events of past 24 hours:  Pt is preoped for EUS/ERCP today.     PAST MEDICAL & SURGICAL HISTORY:  Diabetes  niddm  Hypothyroid  H/O colonoscopy  2022  Previous back surgery  S/P surgical removal of pilonidal cyst  H/O shoulder surgery  right        Vitals:   T(F): 98 (04-06-25 @ 16:10), Max: 98.8 (04-06-25 @ 04:15)  HR: 75 (04-06-25 @ 16:10)  BP: 151/79 (04-06-25 @ 16:10)  RR: 19 (04-06-25 @ 16:10)  SpO2: 99% (04-06-25 @ 16:10)      Diet, NPO after Midnight:      NPO Start Date: 06-Apr-2025,   NPO Start Time: 23:59  Diet, Regular:   Consistent Carbohydrate No Snacks (CSTCHO)  Low Fat (LOWFAT)      Fluids:     I & O's:    04-05-25 @ 07:01  -  04-06-25 @ 07:00  --------------------------------------------------------  IN:    IV PiggyBack: 100 mL    Sodium Chloride 0.9% Bolus: 1000 mL  Total IN: 1100 mL    OUT:  Total OUT: 0 mL    Total NET: 1100 mL        PHYSICAL EXAM:  General: NAD,  calm  HEENT: NCAT, EOMI, icteric eyes  Cardiac: RRR  Respiratory: On RA, normal resp effort  Abdomen: Soft, non-distended, non-tender  Skin: Warm/dry, mild jaundicejaundice    MEDICATIONS  (STANDING):  acetaminophen     Tablet .. 650 milliGRAM(s) Oral every 6 hours  cefTRIAXone   IVPB 1000 milliGRAM(s) IV Intermittent every 24 hours  dextrose 50% Injectable 25 Gram(s) IV Push once  enoxaparin Injectable 40 milliGRAM(s) SubCutaneous every 24 hours  glucagon  Injectable 1 milliGRAM(s) IntraMuscular once  insulin lispro (ADMELOG) corrective regimen sliding scale   SubCutaneous three times a day before meals  lactated ringers. 1000 milliLiter(s) (110 mL/Hr) IV Continuous <Continuous>  levothyroxine 50 MICROGram(s) Oral daily  magnesium sulfate  IVPB 2 Gram(s) IV Intermittent once  metroNIDAZOLE  IVPB 500 milliGRAM(s) IV Intermittent every 12 hours  metroNIDAZOLE  IVPB        MEDICATIONS  (PRN):  ketorolac   Injectable 15 milliGRAM(s) IV Push every 6 hours PRN Moderate Pain (4 - 6)      DVT PROPHYLAXIS: enoxaparin Injectable 40 milliGRAM(s) SubCutaneous every 24 hours    GI PROPHYLAXIS:   ANTICOAGULATION:   ANTIBIOTICS:  cefTRIAXone   IVPB 1000 milliGRAM(s)  metroNIDAZOLE  IVPB 500 milliGRAM(s)  metroNIDAZOLE  IVPB              LAB/STUDIES:  Labs:  CAPILLARY BLOOD GLUCOSE      POCT Blood Glucose.: 143 mg/dL (06 Apr 2025 21:36)  POCT Blood Glucose.: 126 mg/dL (06 Apr 2025 17:13)  POCT Blood Glucose.: 148 mg/dL (06 Apr 2025 11:29)  POCT Blood Glucose.: 138 mg/dL (06 Apr 2025 08:26)                          11.6   5.71  )-----------( 187      ( 06 Apr 2025 21:11 )             33.8       Auto Immature Granulocyte %: 0.7 % (04-06-25 @ 21:11)    04-06    141  |  108  |  8[L]  ----------------------------<  131[H]  3.9   |  22  |  0.7      Calcium: 8.2 mg/dL (04-06-25 @ 21:11)      LFTs:             5.1  | 3.1  | 95       ------------------[349     ( 06 Apr 2025 21:11 )  3.4  | 2.5  | 271         Lipase:x      Amylase:x         Blood Gas Venous - Lactate: 1.6 mmol/L (04-05-25 @ 10:53)      Coags:     13.50  ----< 1.14    ( 05 Apr 2025 20:00 )     27.4                Urinalysis Basic - ( 06 Apr 2025 21:11 )    Color: x / Appearance: x / SG: x / pH: x  Gluc: 131 mg/dL / Ketone: x  / Bili: x / Urobili: x   Blood: x / Protein: x / Nitrite: x   Leuk Esterase: x / RBC: x / WBC x   Sq Epi: x / Non Sq Epi: x / Bacteria: x        Culture - Urine (collected 05 Apr 2025 13:30)  Source: Clean Catch None  Preliminary Report (06 Apr 2025 22:18):    10,000 - 49,000 CFU/mL Gram Positive Cocci in Pairs and Chains    <10,000 CFU/ml Normal Urogenital avelina present    Urinalysis with Rflx Culture (collected 05 Apr 2025 13:30)              IMAGING:      ACCESS/ DEVICES:  [ ] Peripheral IV  [ ] Central Venous Line	[ ] R	[ ] L	[ ] IJ	[ ] Fem	[ ] SC	Placed:   [ ] Arterial Line		[ ] R	[ ] L	[ ] Fem	[ ] Rad	[ ] Ax	Placed:   [ ] PICC:					[ ] Mediport  [ ] Urinary Catheter,  Date Placed:   [ ] Chest tube: [ ] Right, [ ] Left  [ ] WILLA/Mango Drain
This is a 79 year old female patient, with hx of DM and hypothyroidism, presenting to our hospital for Jaundice.     SUBJECTIVE:  Patient seen and examined at bedside. No acute overnight events. No acute complaints this morning. ROS otherwise negative on a 10-point assessment.       Vital Signs Last 24 Hrs  T(C): 36.8 (09 Apr 2025 10:30), Max: 37 (08 Apr 2025 15:57)  T(F): 98.3 (09 Apr 2025 10:30), Max: 98.6 (08 Apr 2025 15:57)  HR: 74 (09 Apr 2025 10:30) (66 - 75)  BP: 174/75 (09 Apr 2025 10:30) (146/76 - 175/77)  BP(mean): 101 (08 Apr 2025 17:05) (101 - 101)  RR: 16 (09 Apr 2025 10:30) (16 - 19)  SpO2: 98% (09 Apr 2025 10:30) (96% - 99%)    Parameters below as of 09 Apr 2025 01:28  Patient On (Oxygen Delivery Method): room air      PHYSICAL EXAM:  GENERAL; Looks comfortable   HEART: Regular rate and rhythm  LUNGS: GBAE   ABDOMEN: Soft, nontender, nondistended, +BS  EXTREMITIES: 2+ peripheral pulses bilaterally.      LABS:                          12.6   8.61  )-----------( 218      ( 08 Apr 2025 23:28 )             36.2     04-08    141  |  105  |  11  ----------------------------<  90  4.3   |  23  |  0.8    Ca    8.8      08 Apr 2025 23:28  Phos  3.5     04-08  Mg     1.8     04-08    TPro  5.6  /  Alb  3.6  /  TBili  1.3  /  DBili  0.9  /  AST  74  /  ALT  166  /  AlkPhos  392  04-08    eGFR: 76 mL/min/1.73m2 (08 Apr 2025 23:28)
 GENERAL SURGERY PROGRESS NOTE     Patient: HENRI ROBERTS , 79y ([birth year])Female   MRN: [id number]  Location: [place] [address] [address]  Visit: 04-05-25 Inpatient  Date: 04-06-25 @ 00:14        Admitted :04-05-25 (1d)  LOS: 1d    Procedure/Dx/Injuries: Symptomatic cholelithiasis         Events of past 24 hours: Patient denies any nausea or vomiting. Passing gas, last bowel movement 4/4. Recieving rocpehin and flagyl for abx  >>> <<<>>> <<<>>> <<<>>> <<<>>> <<<>>> <<<>>> <<<>>> <<<>>> <<<>>> <<<    Vitals:   T(F): 98.4 (04-05-25 @ 20:54), Max: 98.8 (04-05-25 @ 17:29)  HR: 81 (04-05-25 @ 20:54)  BP: 129/73 (04-05-25 @ 20:54)  RR: 18 (04-05-25 @ 20:54)  SpO2: 96% (04-05-25 @ 20:54)      PHYSICAL EXAM:  General: NAD, AAOx3, calm and cooperative  HEENT: NCAT, SUAD, EOMI,   Cardiac: RRR S1, S2,   Respiratory: CTAB, normal respiratory effort, breath sounds equal BL,   Abdomen: Soft, non-distended, RUQ tender, no rebound, no guarding.  Skin: Warm/dry, normal color, jaundiced  >>> <<<>>> <<<>>> <<<>>> <<<>>> <<<>>> <<<>>> <<<>>> <<<>>> <<<>>> <<<   Is & Os:   Diet, Clear Liquid:   Consistent Carbohydrate Evening Snack (CSTCHOSN)    Fluids: lactated ringers.: Solution, 1000 milliLiter(s) infuse at 110 mL/Hr          Bowel Movement: :   Flatus: :   >>> <<<>>> <<<>>> <<<>>> <<<>>> <<<>>> <<<>>> <<<>>> <<<>>> <<<>>> <<<    MEDICATIONS  (STANDING):  acetaminophen     Tablet .. 650 milliGRAM(s) Oral every 6 hours  cefTRIAXone   IVPB 1000 milliGRAM(s) IV Intermittent every 24 hours  dextrose 50% Injectable 25 Gram(s) IV Push once  enoxaparin Injectable 40 milliGRAM(s) SubCutaneous every 24 hours  glucagon  Injectable 1 milliGRAM(s) IntraMuscular once  insulin lispro (ADMELOG) corrective regimen sliding scale   SubCutaneous three times a day before meals  lactated ringers. 1000 milliLiter(s) (110 mL/Hr) IV Continuous <Continuous>  levothyroxine 50 MICROGram(s) Oral daily  metroNIDAZOLE  IVPB 500 milliGRAM(s) IV Intermittent every 12 hours  metroNIDAZOLE  IVPB      sodium phosphate 15 milliMole(s)/250 mL IVPB 15 milliMole(s) IV Intermittent once    MEDICATIONS  (PRN):  ketorolac   Injectable 15 milliGRAM(s) IV Push every 6 hours PRN Moderate Pain (4 - 6)      DVT PROPHYLAXIS: enoxaparin Injectable 40 milliGRAM(s) SubCutaneous every 24 hours    GI PROPHYLAXIS:   ANTICOAGULATION:   ANTIBIOTICS:  cefTRIAXone   IVPB 1000 milliGRAM(s)  metroNIDAZOLE  IVPB 500 milliGRAM(s)  metroNIDAZOLE  IVPB        >>> <<<>>> <<<>>> <<<>>> <<<>>> <<<>>> <<<>>> <<<>>> <<<>>> <<<>>> <<<        LAB/STUDIES:  Labs:  CAPILLARY BLOOD GLUCOSE      POCT Blood Glucose.: 140 mg/dL (05 Apr 2025 21:05)  POCT Blood Glucose.: 128 mg/dL (05 Apr 2025 17:03)  POCT Blood Glucose.: 159 mg/dL (05 Apr 2025 10:39)                          11.7   5.94  )-----------( 181      ( 05 Apr 2025 20:00 )             34.5       Auto Immature Granulocyte %: 0.7 % (04-05-25 @ 20:00)  Auto Immature Granulocyte %: 0.6 % (04-05-25 @ 10:51)    04-05    141  |  107  |  11  ----------------------------<  125[H]  3.8   |  23  |  0.6[L]      Calcium: 8.4 mg/dL (04-05-25 @ 20:00)      LFTs:             5.2  | 4.8  | 187      ------------------[312     ( 05 Apr 2025 20:00 )  3.4  | 3.9  | 395         Lipase:x      Amylase:x         Blood Gas Venous - Lactate: 1.6 mmol/L (04-05-25 @ 10:53)      Coags:     13.50  ----< 1.14    ( 05 Apr 2025 20:00 )     27.4                Urinalysis Basic - ( 05 Apr 2025 20:00 )    Color: x / Appearance: x / SG: x / pH: x  Gluc: 125 mg/dL / Ketone: x  / Bili: x / Urobili: x   Blood: x / Protein: x / Nitrite: x   Leuk Esterase: x / RBC: x / WBC x   Sq Epi: x / Non Sq Epi: x / Bacteria: x        Urinalysis with Rflx Culture (collected 05 Apr 2025 13:30)              
Patient is a pleasant 79-year-old female with a past medical history of diabetes, hypothyroid, who presented initially with right upper quadrant pain and jaundice.  Patient had an ERCP done on March 7 consisting of sphincterotomy and removal of large common bile duct stone.  Patient did not have stent placement.  Patient did well overnight and noted a lot of her right upper quadrant discomfort has improved significantly.  Patient denies melena, fever, chills, or episodes of vomiting.  Patient is scheduled for OR today for cholecystectomy.  Patient has no additional questions.    PAST MEDICAL & SURGICAL HISTORY:  Diabetes  Hypothyroid  Previous back surgery  S/P surgical removal of pilonidal cyst      MEDICATIONS  (STANDING):  acetaminophen     Tablet .. 650 milliGRAM(s) Oral every 6 hours  cefTRIAXone   IVPB 1000 milliGRAM(s) IV Intermittent every 24 hours  chlorhexidine 2% Cloths 1 Application(s) Topical <User Schedule>  dextrose 50% Injectable 25 Gram(s) IV Push once  enoxaparin Injectable 40 milliGRAM(s) SubCutaneous every 24 hours  glucagon  Injectable 1 milliGRAM(s) IntraMuscular once  insulin lispro (ADMELOG) corrective regimen sliding scale   SubCutaneous three times a day before meals  lactated ringers. 1000 milliLiter(s) (100 mL/Hr) IV Continuous <Continuous>  levothyroxine 50 MICROGram(s) Oral daily  metroNIDAZOLE  IVPB 500 milliGRAM(s) IV Intermittent every 12 hours  metroNIDAZOLE  IVPB        MEDICATIONS  (PRN):  ketorolac   Injectable 15 milliGRAM(s) IV Push every 6 hours PRN Moderate Pain (4 - 6)      Allergies  amoxicillin (Urticaria)    Review of Systems  General:  Denies Fatigue, Denies Fever, Denies Weakness ,Denies Weight Loss   HEENT: Denies Trouble Swallowing ,Denies  Sore Throat , Denies Change in hearing/vision/speech ,Denies Dizziness    Cardio: Denies  Chest Pain , Palpitations    Respiratory: Denies worsening of SOB, Denies Cough  Abdomen: See detailed HPI  Neuro: Denies Headache Denies Dizziness, Denies Paresthesias  MSK: Denies pain in Bones/Joints/Muscles   Psych: Patient denies depression, denies suicidal or homicidal ideations  Integ: Patient Denies rash, or new skin lesions     Vital Signs Last 24 Hrs  T(C): 37 (08 Apr 2025 08:28), Max: 37.1 (07 Apr 2025 11:55)  T(F): 98.6 (08 Apr 2025 08:28), Max: 98.7 (07 Apr 2025 11:55)  HR: 71 (08 Apr 2025 08:28) (63 - 81)  BP: 138/76 (08 Apr 2025 08:28) (128/77 - 175/70)  BP(mean): --  RR: 18 (08 Apr 2025 08:28) (16 - 18)  SpO2: 96% (08 Apr 2025 08:28) (94% - 100%)    Parameters below as of 08 Apr 2025 04:19  Patient On (Oxygen Delivery Method): room air    PHYSICAL EXAM:  GENERAL: NAD, well-appearing  CHEST/LUNG: Clear to auscultation bilaterally  HEART: Regular rate and rhythm  ABDOMEN: Soft, Nontender, Nondistended  EXTREMITIES:  No clubbing, cyanosis, or edema      Labs:               13.4   8.44  )-----------( 217      ( 07 Apr 2025 22:15 )             38.9       Auto Immature Granulocyte %: 2.4 % (04-07-25 @ 22:15)    04-07    138  |  103  |  13  ----------------------------<  170[H]  4.4   |  17  |  0.9      Calcium: 8.6 mg/dL (04-07-25 @ 22:15)      LFTs:             5.6  | 1.7  | 52       ------------------[414     ( 07 Apr 2025 22:15 )  3.5  | 1.3  | 210         Blood Gas Venous - Lactate: 1.6 mmol/L (04-05-25 @ 10:53)    Urinalysis with Rflx Culture (collected 05 Apr 2025 13:30)  Culture - Urine (collected 05 Apr 2025 13:30)  Source: Clean Catch None  Final Report (07 Apr 2025 16:23):    10,000 - 49,000 CFU/mL Streptococcus agalactiae (Group B) Group B    streptococci are susceptible to ampicillin,    penicillin and cefazolin, but may be resistant to    erythromycin and clindamycin.    <10,000 CFU/ml Normal Urogenital avelina present

## 2025-04-09 NOTE — PRE-OP CHECKLIST - 1.
Patient denies any clothing on under gown or jewelry on.
RUQ pain
Patient is A/Ox4. Patient verbalized understanding of procedure. Denies pain/discomfort. Safety precautions maintained. All questions and concerns were addressed.

## 2025-04-09 NOTE — BRIEF OPERATIVE NOTE - OPERATION/FINDINGS
Robotic assisted laparoscopic cholecystectomy, critical view achieved, cystic duct clipped x2, specimen removed intact.

## 2025-04-09 NOTE — PROGRESS NOTE ADULT - ATTENDING COMMENTS
Trauma/Acute Care Surgery Attending Note Attestation  Patient was seen and examined bedside.  I reviewed the resident/PA note and agreed with above assessment and plan with following additions and corrections.    No complaints this AM. No nausea, vomiting, or abdominal pain.    T(C): 36.7 (04-08-25 @ 17:05), Max: 37 (04-08-25 @ 08:28)  HR: 75 (04-08-25 @ 17:05) (71 - 81)  BP: 175/77 (04-08-25 @ 17:05) (133/65 - 175/77)  RR: 17 (04-08-25 @ 17:05) (17 - 19)  SpO2: 97% (04-08-25 @ 17:05) (95% - 99%)      04-07-25 @ 07:01  -  04-08-25 @ 07:00  --------------------------------------------------------  IN:    Lactated Ringers: 700 mL  Total IN: 700 mL    OUT:  Total OUT: 0 mL    Total NET: 700 mL      04-08-25 @ 07:01  -  04-08-25 @ 22:12  --------------------------------------------------------  IN:    Lactated Ringers: 900 mL  Total IN: 900 mL    OUT:  Total OUT: 0 mL    Total NET: 900 mL    I independently performed a medically appropriate exam. The exam was notable for soft, not tender, not distended abdomen.                          13.4   8.44  )-----------( 217      ( 07 Apr 2025 22:15 )             38.9     04-07    138  |  103  |  13  ----------------------------<  170[H]  4.4   |  17  |  0.9    Ca 8.6; Mg 1.8; Phos 4.6      ( 07 Apr 2025 22:15 )  Alb: 3.5 g/dL / Pro: 5.6 g/dL / AlkPhos: 414 U/L / ALT: 210 U/L / AST: 52 U/L / GGT:x     / Tbili 1.7 mg/dL/ Dbili 1.3 mg/dL / Indbili 0.4 mg/dL      Assessment/Plan:  79y Female PMH DM and hypothyroidism admitted with choledocholithiasis with obstruction and cholelithiasis.   - Choledocholithiasis with obstruction/cholelithiasis - s/p EUS/ERCP with evacuation of CBD stone; plan for OR for cholecystectomy pending OR availability  - DM - sliding scale insulin, monitor blood glucose levels; blood sugars from past 24h reviewed and appropriate  - Hypothyroidism - levothyroxine 50mcg oral daily  - Electrolyte depletion - 1g magnesium sulfate IVPB given to prevent hypomagnesemia  - DVT ppx    Elbert Coffey MD  Trauma/Acute Care Surgery/Surgical Critical Care Attending
Patient seen and examined with surgery team on rounds and discussed management plans.  elderly female comfortable noticed dark urine and yellowness of eyes in mirror for 3-4 days and came to ED yesterday. Known gall stones from past but did not had surgery as symptoms were mild and intermittent. Exam abd benign    patient with no significant pain now still icteric. Lab reviewed Bili slightly decreased since admission. Patient awaiting GI evaluation for ERCP prior to surgery.
Trauma/Acute Care Surgery Attending Note Attestation  Patient was seen and examined bedside pre-operatively and post-operatively.  I reviewed the resident/PA note and agreed with above assessment and plan with following additions and corrections.    Unable to perform cholecystectomy yesterday due to OR availability. This morning had no abdominal pain but expressed some frustration regarding not being able to undergo cholecystectomy yesterday. Underwent cholecystectomy this morning. Post-operatively doing well and eating without issue. Reports some incisional soreness.    T(C): 36.6 (04-09-25 @ 16:13), Max: 36.8 (04-09-25 @ 00:26)  HR: 77 (04-09-25 @ 16:13) (66 - 86)  BP: 121/66 (04-09-25 @ 16:13) (113/58 - 174/75)  RR: 18 (04-09-25 @ 16:13) (14 - 22)  SpO2: 96% (04-09-25 @ 16:13) (95% - 98%)      04-08-25 @ 07:01  -  04-09-25 @ 07:00  --------------------------------------------------------  IN:    Lactated Ringers: 1900 mL  Total IN: 1900 mL    OUT:  Total OUT: 0 mL    Total NET: 1900 mL      04-09-25 @ 07:01  -  04-09-25 @ 17:47  --------------------------------------------------------  IN:  Total IN: 0 mL    OUT:    Voided (mL): 500 mL  Total OUT: 500 mL    Total NET: -500 mL    I independently performed a medically appropriate exam. In the morning, the exam was notable for soft, not tender, not distended abdomen. Post-operatively, the exam was notable for mild incisional tenderness around surgical wounds with the abdomen otherwise soft and not distended.                          12.6   8.61  )-----------( 218      ( 08 Apr 2025 23:28 )             36.2     04-08    141  |  105  |  11  ----------------------------<  90  4.3   |  23  |  0.8    Ca 8.8; Mg 1.8; Phos 3.5      ( 08 Apr 2025 23:28 )  Alb: 3.6 g/dL / Pro: 5.6 g/dL / AlkPhos: 392 U/L / ALT: 166 U/L / AST: 74 U/L / GGT:x     / Tbili 1.3 mg/dL/ Dbili 0.9 mg/dL / Indbili 0.4 mg/dL    PT/INR/PTT - ( 09 Apr 2025 06:53 )   PT: 10.80 sec; INR: 0.92 ratio; PTT 30.3 sec      Assessment/Plan:  79y Female PMH DM and hypothyroidism admitted with choledocholithiasis with obstruction and cholelithiasis.   - Choledocholithiasis with obstruction/cholelithiasis - s/p EUS/ERCP with evacuation of CBD stone; s/p cholecystectomy today, regular diet, plan for possible discharge tomorrow after period of observation  - DM - sliding scale insulin, monitor blood glucose levels; blood glucose from past 24h reviewed and are appropriate  - Hypothyroidism - can continue levothyroxine 50mcg oral daily  - DVT ppx    Elbert Coffey MD  Trauma/Acute Care Surgery/Surgical Critical Care Attending
Trauma/Acute Care Surgery Attending Note Attestation  Patient was seen and examined bedside.  I reviewed the resident/PA note and agreed with above assessment and plan with following additions and corrections.    Seen after ERCP. Abdominal pain resolved. No nausea, vomiting.    T(C): 36.6 (04-07-25 @ 16:00), Max: 37.1 (04-07-25 @ 00:11)  HR: 73 (04-07-25 @ 16:00) (63 - 98)  BP: 132/66 (04-07-25 @ 16:00) (117/67 - 175/70)  RR: 18 (04-07-25 @ 16:00) (16 - 19)  SpO2: 95% (04-07-25 @ 16:00) (94% - 99%)      04-06-25 @ 07:01  -  04-07-25 @ 07:00  --------------------------------------------------------  IN:    Lactated Ringers: 1210 mL  Total IN: 1210 mL    OUT:    Voided (mL): 700 mL  Total OUT: 700 mL    Total NET: 510 mL    I independently performed a medically appropriate exam. The exam was notable for soft, not tender, not distended abdomen.                          11.6   5.71  )-----------( 187      ( 06 Apr 2025 21:11 )             33.8     04-06    141  |  108  |  8[L]  ----------------------------<  131[H]  3.9   |  22  |  0.7    Ca 8.2[L]; Mg 1.8; Phos 2.9      ( 06 Apr 2025 21:11 )  Alb: 3.4 g/dL / Pro: 5.1 g/dL / AlkPhos: 349 U/L / ALT: 271 U/L / AST: 95 U/L / GGT:x     / Tbili 3.1 mg/dL/ Dbili 2.5 mg/dL / Indbili 0.6 mg/dL      Assessment/Plan:  79y Female PMH DM and hypothyroidism admitted with choledocholithiasis with obstruction and cholelithiasis.   - Choledocholithiasis with obstruction/cholelithiasis - s/p EUS/ERCP with evacuation of CBD stone; discussed indication for cholecystectomy to reduce recurrence, patient amenable to cholecystectomy, plan for cholecystectomy on 4/8  - DM - sliding scale insulin, monitor blood glucose levels; blood sugars reviewed and are appropriate  - Hypothyroidism - levothyroxine 50mcg oral daily  - DVT ppx    Elbert Coffey MD  Trauma/Acute Care Surgery/Surgical Critical Care Attending

## 2025-04-09 NOTE — PRE-OP CHECKLIST - LATEX ALLERGY
Start a daily fiber supplement such as Citrucel or Metamucil. Start with once a day. These can be found over the counter at most stores.    You should get a call to schedule a colonoscopy.    Return to clinic if bleeding persists or worsens.    
no

## 2025-04-10 ENCOUNTER — TRANSCRIPTION ENCOUNTER (OUTPATIENT)
Age: 80
End: 2025-04-10

## 2025-04-10 VITALS
OXYGEN SATURATION: 97 % | SYSTOLIC BLOOD PRESSURE: 144 MMHG | TEMPERATURE: 98 F | DIASTOLIC BLOOD PRESSURE: 71 MMHG | HEART RATE: 72 BPM | RESPIRATION RATE: 18 BRPM

## 2025-04-10 LAB — GLUCOSE BLDC GLUCOMTR-MCNC: 118 MG/DL — HIGH (ref 70–99)

## 2025-04-10 PROCEDURE — 99238 HOSP IP/OBS DSCHRG MGMT 30/<: CPT | Mod: 24

## 2025-04-10 RX ADMIN — Medication 650 MILLIGRAM(S): at 01:36

## 2025-04-10 RX ADMIN — Medication 650 MILLIGRAM(S): at 06:00

## 2025-04-10 RX ADMIN — Medication 50 MICROGRAM(S): at 06:00

## 2025-04-10 NOTE — DISCHARGE NOTE PROVIDER - HOSPITAL COURSE
Patient is a pleasant 79-year-old female with a past medical history of diabetes, hypothyroid, who presented initially with right upper quadrant pain and jaundice.  Patient had an ERCP done on March 7 consisting of sphincterotomy and removal of large common bile duct stone.  Patient did not have stent placement.  PPatient is now s/p ERCP with no complications, stone removal and sphincterotomy and now S/p Robotic CCY uncomplicated. The patient has been ambulatory, voiding, tolerating regular diet and feels well. Patient is cleared for discharge from surgical standpoint. Patient is a pleasant 79-year-old female with a past medical history of diabetes, hypothyroid, who presented initially with right upper quadrant pain and jaundice.  Patient had an ERCP done on March 7 consisting of sphincterotomy and removal of large common bile duct stone.  Patient did not have stent placement.  Patient is now s/p ERCP with no complications, stone removal and sphincterotomy and now S/p Robotic CCY uncomplicated. The patient has been ambulatory, voiding, tolerating regular diet and feels well. Patient is cleared for discharge from surgical standpoint.     Patient was on sliding scale insulin for diabetes type 2 while inpatient. Plan to restart metformin 500mg BID on discharge. Resume home synthroid 50mcg oral daily for hypothyroidism.

## 2025-04-10 NOTE — DISCHARGE NOTE PROVIDER - NSDCMRMEDTOKEN_GEN_ALL_CORE_FT
levothyroxine 50 mcg (0.05 mg) oral capsule: 1 cap(s) orally once a day  metFORMIN 500 mg oral tablet: 1 tab(s) orally 2 times a day

## 2025-04-10 NOTE — DISCHARGE NOTE PROVIDER - NSDCCPCAREPLAN_GEN_ALL_CORE_FT
PRINCIPAL DISCHARGE DIAGNOSIS  Diagnosis: Choledocholithiasis  Assessment and Plan of Treatment: Follow Up with Dr. Coffey  in 1-2 weeks. Please call office for confirmation of your appointment.  Diet: Low Fat Diet  Pain: You can take over the counter medications such as Tylenol, and Ibuprofen for pain control. Please adhere to the instructions on the back of the bottle.   If you develop fevers, chills, worsening pain, increased drainage from the wound, foul smelling drainage from the wound, nausea that won't subside, vomiting, or any other symptoms of concern, please call MD for further advice, evaluation, and/or treatment.  Activity: Ambulate and get out of bed as tolerated, and with assistance if feeling weak.        SECONDARY DISCHARGE DIAGNOSES  Diagnosis: Jaundice  Assessment and Plan of Treatment:

## 2025-04-10 NOTE — DISCHARGE NOTE PROVIDER - NSDCFUADDINST_GEN_ALL_CORE_FT
Maintain Low fat diet. If you develop Fever, chills, worsening abdominal pain, nausea vomiting chest pain or Shortness of breath please call MD or return to closest ED.

## 2025-04-10 NOTE — DISCHARGE NOTE PROVIDER - ATTENDING DISCHARGE PHYSICAL EXAMINATION:
I performed a medically appropriate exam. Exam notable for well-healing surgical incisions with skin glue with mild incisional tenderness. Abdomen otherwise soft and not distended.

## 2025-04-10 NOTE — DISCHARGE NOTE NURSING/CASE MANAGEMENT/SOCIAL WORK - PATIENT PORTAL LINK FT
You can access the FollowMyHealth Patient Portal offered by [facility] by registering at the following website: [email or web address]. By joining [facility]’s FollowMyHealth portal, you will also be able to view your health information using other applications (apps) compatible with our system.

## 2025-04-10 NOTE — DISCHARGE NOTE PROVIDER - CARE PROVIDER_API CALL
Elbert Coffey J  Surgery  [address] [place] [address] [address] [place] [address]  Phone: [phone]  Fax: [phone]  Follow Up Time:

## 2025-04-10 NOTE — DISCHARGE NOTE NURSING/CASE MANAGEMENT/SOCIAL WORK - NSDCPEFALRISK_GEN_ALL_CORE
For information on Fall & Injury Prevention, visit: [email or web address] OR  [email or web address] OR  https://www.cdc.gov/steadi/patient.html

## 2025-04-10 NOTE — DISCHARGE NOTE NURSING/CASE MANAGEMENT/SOCIAL WORK - FINANCIAL ASSISTANCE
[facility] provides services at a reduced cost to those who are determined to be eligible through [facility]’s financial assistance program. Information regarding [facility]’s financial assistance program can be found by going to [email or web address] or by calling [phone].

## 2025-04-14 NOTE — CDI QUERY NOTE - NSCDIOTHERTXTBX_GEN_ALL_CORE_HH
Clinical documentation and/or evidence of the patient’s presentation, evaluation, and medical management, as evidenced below, may support a diagnosis that is not documented in the medical record.  In order to ensure accurate coding and accuracy of the clinical record, the documentation in this patient’s medical record requires additional clarification.      If you think the supporting documentation and/or clinical evidence supports a more specific diagnosis, please include more specific documentation of a diagnosis associated with these findings in your progress note and/or discharge summary.    Please clarify if sodium level of 132 can be further specified as:  •	Hyponatremia that resolved after treatment  •	Clinically insignificant sodium levels  •	Other (specify)    Supporting documentation and/or clinical evidence:     •	4-5 H&P… 79yF… resented with jaundice associated with RUQ pain and nausea… Admit to surgery for choledocholithiasis    Labs  •	4-5 10:51 Sodium Level 132   •	4-5 20:00 Sodium Level 141    Treatment  •	4-5 Normal Saline 1000ml IVF bolus

## 2025-04-16 NOTE — CHART NOTE - NSCHARTNOTEFT_GEN_A_CORE
PACU ANESTHESIA ADMISSION NOTE      Procedure: Robot-assisted cholecystectomy      Post op diagnosis:  Acute cholecystitis          __x__  Patent Airway    __x__  Full return of protective reflexes    __x__  Full recovery from anesthesia / back to baseline status    Vitals:  T(C): 36.8 (04-09-25 @ 14:30), Max: 37 (04-08-25 @ 15:57)  HR: 78 (04-09-25 @ 14:30) (66 - 86)  BP: 120/50 (04-09-25 @ 14:30) (113/58 - 175/77)  RR: 15 (04-09-25 @ 14:30) (14 - 22)  SpO2: 95% (04-09-25 @ 14:30) (95% - 99%)    Mental Status:  __x__ Awake   ___x__ Alert   _____ Drowsy   _____ Sedated    Nausea/Vomiting:  __x__ NO  ______Yes,   See Post - Op Orders          Pain Scale (0-10):  _____    Treatment: ____ None    __x__ See Post - Op/PCA Orders    Post - Operative Fluids:   ____ Oral   __x__ See Post - Op Orders    Plan: Discharge:   ____Home       ____x_Floor     _____Critical Care    _____  Other:_________________    Comments: Patient had smooth intraoperative event, no anesthesia complication.  PACU Vital signs: HR:            BP:        /          RR:             O2 Sat:       %     Temp
Patient s/p ERCP with sphincterotomy and removal of large stone utilizing a basket  - No stent placed  - Can have clear liquids today if without significant pain or fevers starting at 14:00  - Will follow
Surgery Post-Op Note    CC:  Pre-Op Dx: Other cholelithiasis without obstruction    Acute cholecystitis    Symptomatic cholelithiasis      Procedure: Robot-assisted cholecystectomy      Surgeon: Dr. Coffey    Vital Signs Last 24 Hrs  T(C): 36.8 (09 Apr 2025 20:23), Max: 36.8 (09 Apr 2025 00:26)  T(F): 98.2 (09 Apr 2025 20:23), Max: 98.3 (09 Apr 2025 10:30)  HR: 76 (09 Apr 2025 20:23) (66 - 86)  BP: 120/56 (09 Apr 2025 20:23) (113/58 - 174/75)  BP(mean): --  RR: 18 (09 Apr 2025 20:23) (14 - 22)  SpO2: 98% (09 Apr 2025 20:23) (95% - 98%)    Parameters below as of 09 Apr 2025 14:30  Patient On (Oxygen Delivery Method): room air    PHYSICAL EXAM:  GENERAL: NAD, well-appearing  CHEST/LUNG: Equal chest rise bilaterally  HEART: Regular rate and rhythm  ABDOMEN: Soft, Nontender, Nondistended. incisions dermabonded, c/d/i.  EXTREMITIES:  No clubbing, cyanosis, or edema        LABS:                        12.6   8.61  )-----------( 218      ( 08 Apr 2025 23:28 )             36.2     04-09    141  |  101  |  14  ----------------------------<  215[H]  4.3   |  17  |  0.8    Ca    8.4      09 Apr 2025 21:49  Phos  5.2     04-09  Mg     1.9     04-09    TPro  5.6[L]  /  Alb  3.6  /  TBili  1.3[H]  /  DBili  0.9[H]  /  AST  74[H]  /  ALT  166[H]  /  AlkPhos  392[H]  04-08    PT/INR - ( 09 Apr 2025 06:53 )   PT: 10.80 sec;   INR: 0.92 ratio         PTT - ( 09 Apr 2025 06:53 )  PTT:30.3 sec  CAPILLARY BLOOD GLUCOSE      POCT Blood Glucose.: 207 mg/dL (09 Apr 2025 20:54)  POCT Blood Glucose.: 161 mg/dL (09 Apr 2025 16:48)  POCT Blood Glucose.: 117 mg/dL (09 Apr 2025 08:22)    LIVER FUNCTIONS - ( 08 Apr 2025 23:28 )  Alb: 3.6 g/dL / Pro: 5.6 g/dL / ALK PHOS: 392 U/L / ALT: 166 U/L / AST: 74 U/L / GGT: x           Urinalysis Basic - ( 09 Apr 2025 21:49 )    Color: x / Appearance: x / SG: x / pH: x  Gluc: 215 mg/dL / Ketone: x  / Bili: x / Urobili: x   Blood: x / Protein: x / Nitrite: x   Leuk Esterase: x / RBC: x / WBC x   Sq Epi: x / Non Sq Epi: x / Bacteria: x        Radiology and Additional Studies:    Assessment:79y F here with choledocholithiasis, s/p ercp with stone removal and sphincterectomy, s/p robo cholecystectomy    -plan for possible discharge tomorrow after period of observation  -low fat diet  -PM labs, replete prn  - DM - sliding scale insulin, monitor blood glucose levels; blood glucose from past 24h reviewed and are appropriate  - Hypothyroidism - can continue levothyroxine 50mcg oral daily  - DVT ppx
Patient had hyponatremia that resolved after treatment with intravenous fluid resuscitation.

## 2025-04-22 DIAGNOSIS — E11.9 TYPE 2 DIABETES MELLITUS WITHOUT COMPLICATIONS: ICD-10-CM

## 2025-04-22 DIAGNOSIS — E03.9 HYPOTHYROIDISM, UNSPECIFIED: ICD-10-CM

## 2025-04-22 DIAGNOSIS — K80.51 CALCULUS OF BILE DUCT WITHOUT CHOLANGITIS OR CHOLECYSTITIS WITH OBSTRUCTION: ICD-10-CM

## 2025-04-22 DIAGNOSIS — E87.1 HYPO-OSMOLALITY AND HYPONATREMIA: ICD-10-CM

## 2025-04-22 DIAGNOSIS — K80.43 CALCULUS OF BILE DUCT WITH ACUTE CHOLECYSTITIS WITH OBSTRUCTION: ICD-10-CM

## 2025-04-22 DIAGNOSIS — R74.01 ELEVATION OF LEVELS OF LIVER TRANSAMINASE LEVELS: ICD-10-CM

## 2025-05-29 ENCOUNTER — APPOINTMENT (OUTPATIENT)
Dept: SURGERY | Facility: CLINIC | Age: 80
End: 2025-05-29

## 2025-05-29 VITALS
HEIGHT: 60 IN | WEIGHT: 140 LBS | HEART RATE: 62 BPM | OXYGEN SATURATION: 97 % | SYSTOLIC BLOOD PRESSURE: 136 MMHG | BODY MASS INDEX: 27.48 KG/M2 | TEMPERATURE: 97 F | DIASTOLIC BLOOD PRESSURE: 74 MMHG

## 2025-05-29 PROCEDURE — 99024 POSTOP FOLLOW-UP VISIT: CPT

## 2025-08-08 ENCOUNTER — APPOINTMENT (OUTPATIENT)
Dept: ORTHOPEDIC SURGERY | Facility: CLINIC | Age: 80
End: 2025-08-08
Payer: MEDICARE

## 2025-08-08 VITALS — HEIGHT: 60 IN | BODY MASS INDEX: 27.48 KG/M2 | WEIGHT: 140 LBS

## 2025-08-08 DIAGNOSIS — M17.11 UNILATERAL PRIMARY OSTEOARTHRITIS, RIGHT KNEE: ICD-10-CM

## 2025-08-08 DIAGNOSIS — Z96.652 PRESENCE OF LEFT ARTIFICIAL KNEE JOINT: ICD-10-CM

## 2025-08-08 PROCEDURE — 73564 X-RAY EXAM KNEE 4 OR MORE: CPT | Mod: 50

## 2025-08-08 PROCEDURE — 99213 OFFICE O/P EST LOW 20 MIN: CPT

## 2025-08-08 PROCEDURE — 73522 X-RAY EXAM HIPS BI 3-4 VIEWS: CPT

## 2025-08-12 ENCOUNTER — APPOINTMENT (OUTPATIENT)
Dept: ENDOCRINOLOGY | Facility: CLINIC | Age: 80
End: 2025-08-12
Payer: MEDICARE

## 2025-08-12 ENCOUNTER — NON-APPOINTMENT (OUTPATIENT)
Age: 80
End: 2025-08-12

## 2025-08-12 VITALS
HEART RATE: 70 BPM | OXYGEN SATURATION: 95 % | WEIGHT: 144 LBS | BODY MASS INDEX: 28.27 KG/M2 | HEIGHT: 60 IN | SYSTOLIC BLOOD PRESSURE: 118 MMHG | DIASTOLIC BLOOD PRESSURE: 72 MMHG

## 2025-08-12 DIAGNOSIS — I10 ESSENTIAL (PRIMARY) HYPERTENSION: ICD-10-CM

## 2025-08-12 DIAGNOSIS — E78.5 HYPERLIPIDEMIA, UNSPECIFIED: ICD-10-CM

## 2025-08-12 DIAGNOSIS — Z86.39 PERSONAL HISTORY OF OTHER ENDOCRINE, NUTRITIONAL AND METABOLIC DISEASE: ICD-10-CM

## 2025-08-12 DIAGNOSIS — Z87.39 PERSONAL HISTORY OF OTHER DISEASES OF THE MUSCULOSKELETAL SYSTEM AND CONNECTIVE TISSUE: ICD-10-CM

## 2025-08-12 DIAGNOSIS — E03.9 HYPOTHYROIDISM, UNSPECIFIED: ICD-10-CM

## 2025-08-12 DIAGNOSIS — E11.9 TYPE 2 DIABETES MELLITUS W/OUT COMPLICATIONS: ICD-10-CM

## 2025-08-12 PROCEDURE — 99214 OFFICE O/P EST MOD 30 MIN: CPT
